# Patient Record
Sex: MALE | Race: WHITE | NOT HISPANIC OR LATINO | Employment: OTHER | URBAN - METROPOLITAN AREA
[De-identification: names, ages, dates, MRNs, and addresses within clinical notes are randomized per-mention and may not be internally consistent; named-entity substitution may affect disease eponyms.]

---

## 2017-01-10 ENCOUNTER — HISTORICAL (OUTPATIENT)
Dept: RADIOLOGY | Facility: HOSPITAL | Age: 72
End: 2017-01-10

## 2017-02-03 ENCOUNTER — HISTORICAL (OUTPATIENT)
Dept: LAB | Facility: HOSPITAL | Age: 72
End: 2017-02-03

## 2017-03-17 ENCOUNTER — HISTORICAL (OUTPATIENT)
Dept: RADIOLOGY | Facility: HOSPITAL | Age: 72
End: 2017-03-17

## 2017-07-17 ENCOUNTER — HISTORICAL (OUTPATIENT)
Dept: RADIOLOGY | Facility: HOSPITAL | Age: 72
End: 2017-07-17

## 2017-08-25 ENCOUNTER — HISTORICAL (OUTPATIENT)
Dept: RADIOLOGY | Facility: HOSPITAL | Age: 72
End: 2017-08-25

## 2017-10-03 ENCOUNTER — HISTORICAL (OUTPATIENT)
Dept: LAB | Facility: HOSPITAL | Age: 72
End: 2017-10-03

## 2017-10-03 LAB
BUN SERPL-MCNC: 13 MG/DL (ref 7–18)
CALCIUM SERPL-MCNC: 8.6 MG/DL (ref 8.5–10.1)
CHLORIDE SERPL-SCNC: 95 MMOL/L (ref 98–107)
CO2 SERPL-SCNC: 33.8 MMOL/L (ref 21–32)
CREAT SERPL-MCNC: 1.09 MG/DL (ref 0.7–1.3)
GLUCOSE SERPL-MCNC: 128 MG/DL (ref 74–106)
POTASSIUM SERPL-SCNC: 4.1 MMOL/L (ref 3.5–5.1)
SODIUM SERPL-SCNC: 132 MMOL/L (ref 136–145)

## 2017-12-22 ENCOUNTER — HOSPITAL ENCOUNTER (OUTPATIENT)
Dept: MEDSURG UNIT | Facility: HOSPITAL | Age: 72
End: 2017-12-23
Attending: FAMILY MEDICINE | Admitting: FAMILY MEDICINE

## 2017-12-22 LAB
CK MB SERPL-MCNC: 1.5 NG/ML (ref 0.5–3.6)
CK MB SERPL-MCNC: 2.3 NG/ML (ref 0.5–3.6)
CK SERPL-CCNC: 160 MG/DL (ref 26–308)
CK SERPL-CCNC: 201 MG/DL (ref 26–308)
TROPONIN I SERPL-MCNC: <0.017 NG/ML (ref 0.02–0.06)
TROPONIN I SERPL-MCNC: <0.017 NG/ML (ref 0.02–0.06)

## 2017-12-23 LAB
BUN SERPL-MCNC: 8 MG/DL (ref 7–18)
CALCIUM SERPL-MCNC: 8.3 MG/DL (ref 8.5–10.1)
CHLORIDE SERPL-SCNC: 102 MMOL/L (ref 98–107)
CK MB SERPL-MCNC: 1.8 NG/ML (ref 0.5–3.6)
CK SERPL-CCNC: 180 MG/DL (ref 26–308)
CO2 SERPL-SCNC: 25.9 MMOL/L (ref 21–32)
CREAT SERPL-MCNC: 0.98 MG/DL (ref 0.7–1.3)
GLUCOSE SERPL-MCNC: 102 MG/DL (ref 74–106)
POTASSIUM SERPL-SCNC: 4.2 MMOL/L (ref 3.5–5.1)
SODIUM SERPL-SCNC: 134 MMOL/L (ref 136–145)
TROPONIN I SERPL-MCNC: <0.017 NG/ML (ref 0.02–0.06)

## 2018-01-23 ENCOUNTER — HOSPITAL ENCOUNTER (OUTPATIENT)
Dept: MEDSURG UNIT | Facility: HOSPITAL | Age: 73
End: 2018-01-25
Attending: FAMILY MEDICINE | Admitting: FAMILY MEDICINE

## 2018-01-24 LAB
ABS NEUT (OLG): 5.98
ALBUMIN SERPL-MCNC: 3.2 GM/DL (ref 3.4–5)
ALBUMIN/GLOB SERPL: 0.9 RATIO (ref 1.1–2)
ALP SERPL-CCNC: 74 UNIT/L (ref 46–116)
ALT SERPL-CCNC: 18 UNIT/L (ref 12–78)
AST SERPL-CCNC: 14 UNIT/L (ref 10–37)
BASOPHILS # BLD AUTO: 0.02 X10(3)/MCL
BASOPHILS NFR BLD AUTO: 0.2 %
BILIRUB SERPL-MCNC: 0.4 MG/DL (ref 0.2–1)
BILIRUBIN DIRECT+TOT PNL SERPL-MCNC: 0.17 MG/DL (ref 0–0.2)
BILIRUBIN DIRECT+TOT PNL SERPL-MCNC: 0.22 MG/DL
BUN SERPL-MCNC: 12 MG/DL (ref 7–18)
CALCIUM SERPL-MCNC: 8.4 MG/DL (ref 8.5–10.1)
CHLORIDE SERPL-SCNC: 99 MMOL/L (ref 98–107)
CK MB SERPL-MCNC: 0.5 NG/ML (ref 0.5–3.6)
CK MB SERPL-MCNC: 0.6 NG/ML (ref 0.5–3.6)
CK SERPL-CCNC: 122 MG/DL (ref 26–308)
CK SERPL-CCNC: 131 MG/DL (ref 26–308)
CO2 SERPL-SCNC: 26.4 MMOL/L (ref 21–32)
CREAT SERPL-MCNC: 1.1 MG/DL (ref 0.7–1.3)
EOSINOPHIL # BLD AUTO: 0.17 X10(3)/MCL
EOSINOPHIL NFR BLD AUTO: 2.1 %
ERYTHROCYTE [DISTWIDTH] IN BLOOD BY AUTOMATED COUNT: 13 %
GLOBULIN SER-MCNC: 3.7 GM/DL (ref 2.4–3.5)
GLUCOSE SERPL-MCNC: 121 MG/DL (ref 74–106)
HCT VFR BLD AUTO: 34.2 % (ref 39–49)
HGB BLD-MCNC: 11.7 GM/DL (ref 12.6–16.6)
IMM GRANULOCYTES # BLD AUTO: 0.01 10*3/UL (ref 0–0.1)
IMM GRANULOCYTES NFR BLD AUTO: 0.1 % (ref 0–1)
LYMPHOCYTES # BLD AUTO: 1.09 X10(3)/MCL
LYMPHOCYTES NFR BLD AUTO: 13.4 %
MCH RBC QN AUTO: 29.6 PG (ref 27–33)
MCHC RBC AUTO-ENTMCNC: 34.2 GM/DL (ref 32–35)
MCV RBC AUTO: 86.6 FL (ref 84–97)
MONOCYTES # BLD AUTO: 0.84 X10(3)/MCL
MONOCYTES NFR BLD AUTO: 10.4 %
NEUTROPHILS # BLD AUTO: 5.98 X10(3)/MCL
NEUTROPHILS NFR BLD AUTO: 73.8 %
PLATELET # BLD AUTO: 166 X10(3)/MCL (ref 151–368)
PMV BLD AUTO: 10 FL
POTASSIUM SERPL-SCNC: 3.6 MMOL/L (ref 3.5–5.1)
PROT SERPL-MCNC: 6.9 GM/DL (ref 6.4–8.2)
RBC # BLD AUTO: 3.95 X10(6)/MCL (ref 4.3–5.6)
SODIUM SERPL-SCNC: 135 MMOL/L (ref 136–145)
TROPONIN I SERPL-MCNC: <0.017 NG/ML (ref 0.02–0.06)
TROPONIN I SERPL-MCNC: <0.017 NG/ML (ref 0.02–0.06)
WBC # SPEC AUTO: 8.11 X10(3)/MCL (ref 3.4–9.2)

## 2018-01-31 ENCOUNTER — HISTORICAL (OUTPATIENT)
Dept: RADIOLOGY | Facility: HOSPITAL | Age: 73
End: 2018-01-31

## 2018-01-31 LAB
ABS NEUT (OLG): 5.78
ALBUMIN SERPL-MCNC: 3.6 GM/DL (ref 3.4–5)
ALBUMIN/GLOB SERPL: 0.9 RATIO (ref 1.1–2)
ALP SERPL-CCNC: 105 UNIT/L (ref 46–116)
ALT SERPL-CCNC: 22 UNIT/L (ref 12–78)
AST SERPL-CCNC: 17 UNIT/L (ref 10–37)
BASOPHILS # BLD AUTO: 0.05 X10(3)/MCL
BASOPHILS NFR BLD AUTO: 0.6 %
BILIRUB SERPL-MCNC: 0.2 MG/DL (ref 0.2–1)
BILIRUBIN DIRECT+TOT PNL SERPL-MCNC: 0.08 MG/DL (ref 0–0.2)
BILIRUBIN DIRECT+TOT PNL SERPL-MCNC: 0.16 MG/DL
BUN SERPL-MCNC: 10 MG/DL (ref 7–18)
CALCIUM SERPL-MCNC: 9 MG/DL (ref 8.5–10.1)
CHLORIDE SERPL-SCNC: 93 MMOL/L (ref 98–107)
CO2 SERPL-SCNC: 28.1 MMOL/L (ref 21–32)
CREAT SERPL-MCNC: 1.02 MG/DL (ref 0.7–1.3)
EOSINOPHIL # BLD AUTO: 0.21 X10(3)/MCL
EOSINOPHIL NFR BLD AUTO: 2.7 %
ERYTHROCYTE [DISTWIDTH] IN BLOOD BY AUTOMATED COUNT: 13 %
GLOBULIN SER-MCNC: 4.2 GM/DL (ref 2.4–3.5)
GLUCOSE SERPL-MCNC: 77 MG/DL (ref 74–106)
HCT VFR BLD AUTO: 39.4 % (ref 39–49)
HGB BLD-MCNC: 13.5 GM/DL (ref 12.6–16.6)
IMM GRANULOCYTES # BLD AUTO: 0.15 10*3/UL (ref 0–0.1)
IMM GRANULOCYTES NFR BLD AUTO: 1.9 % (ref 0–1)
INR PPP: 0.98
LYMPHOCYTES # BLD AUTO: 1.05 X10(3)/MCL
LYMPHOCYTES NFR BLD AUTO: 13.5 %
MCH RBC QN AUTO: 29.1 PG (ref 27–33)
MCHC RBC AUTO-ENTMCNC: 34.3 GM/DL (ref 32–35)
MCV RBC AUTO: 84.9 FL (ref 84–97)
MONOCYTES # BLD AUTO: 0.53 X10(3)/MCL
MONOCYTES NFR BLD AUTO: 6.8 %
NEUTROPHILS # BLD AUTO: 5.78 X10(3)/MCL
NEUTROPHILS NFR BLD AUTO: 74.5 %
PLATELET # BLD AUTO: 320 X10(3)/MCL (ref 151–368)
PMV BLD AUTO: 10 FL
POTASSIUM SERPL-SCNC: 5.3 MMOL/L (ref 3.5–5.1)
PROT SERPL-MCNC: 7.8 GM/DL (ref 6.4–8.2)
PROTHROMBIN TIME: 9.9 SECOND(S) (ref 9–11.5)
RBC # BLD AUTO: 4.64 X10(6)/MCL (ref 4.3–5.6)
SODIUM SERPL-SCNC: 129 MMOL/L (ref 136–145)
WBC # SPEC AUTO: 7.77 X10(3)/MCL (ref 3.4–9.2)

## 2018-04-27 ENCOUNTER — HISTORICAL (OUTPATIENT)
Dept: LAB | Facility: HOSPITAL | Age: 73
End: 2018-04-27

## 2018-04-27 LAB
CHOLEST SERPL-MCNC: 130 MG/DL (ref 50–200)
CHOLEST/HDLC SERPL: 3 {RATIO} (ref 0–5)
HDLC SERPL-MCNC: 38 MG/DL (ref 35–60)
LDLC SERPL CALC-MCNC: 78 MG/DL (ref 50–140)
PSA SERPL-MCNC: 1.34 NG/ML (ref 0–4)
TRIGL SERPL-MCNC: 72 MG/DL (ref 30–150)
VLDLC SERPL CALC-MCNC: 14 MG/DL

## 2018-08-06 ENCOUNTER — HISTORICAL (OUTPATIENT)
Dept: RADIOLOGY | Facility: HOSPITAL | Age: 73
End: 2018-08-06

## 2018-09-06 ENCOUNTER — HISTORICAL (OUTPATIENT)
Dept: LAB | Facility: HOSPITAL | Age: 73
End: 2018-09-06

## 2018-09-06 LAB
ALBUMIN SERPL-MCNC: 3.4 GM/DL (ref 3.4–5)
ALP SERPL-CCNC: 92 UNIT/L (ref 46–116)
ALT SERPL-CCNC: 16 UNIT/L (ref 12–78)
AST SERPL-CCNC: 12 UNIT/L (ref 10–37)
BILIRUB SERPL-MCNC: 0.4 MG/DL (ref 0.2–1)
BILIRUBIN DIRECT+TOT PNL SERPL-MCNC: 0.11 MG/DL (ref 0–0.2)
BILIRUBIN DIRECT+TOT PNL SERPL-MCNC: 0.29 MG/DL
CHOLEST SERPL-MCNC: 131 MG/DL (ref 50–200)
CHOLEST/HDLC SERPL: 3 {RATIO} (ref 0–5)
HDLC SERPL-MCNC: 40 MG/DL (ref 35–60)
LDLC SERPL CALC-MCNC: 78 MG/DL (ref 50–140)
PROT SERPL-MCNC: 7.3 GM/DL (ref 6.4–8.2)
TRIGL SERPL-MCNC: 67 MG/DL (ref 30–150)
VLDLC SERPL CALC-MCNC: 13 MG/DL

## 2019-02-04 ENCOUNTER — HISTORICAL (OUTPATIENT)
Dept: RADIOLOGY | Facility: HOSPITAL | Age: 74
End: 2019-02-04

## 2019-03-22 ENCOUNTER — HISTORICAL (OUTPATIENT)
Dept: RADIOLOGY | Facility: HOSPITAL | Age: 74
End: 2019-03-22

## 2019-03-27 ENCOUNTER — HISTORICAL (OUTPATIENT)
Dept: RADIOLOGY | Facility: HOSPITAL | Age: 74
End: 2019-03-27

## 2019-05-09 ENCOUNTER — HISTORICAL (OUTPATIENT)
Dept: RADIOLOGY | Facility: HOSPITAL | Age: 74
End: 2019-05-09

## 2019-05-09 LAB
ABS NEUT (OLG): 6.47
ALBUMIN SERPL-MCNC: 3.3 GM/DL (ref 3.4–5)
ALBUMIN/GLOB SERPL: 1 RATIO (ref 1.1–2)
ALP SERPL-CCNC: 84 UNIT/L (ref 46–116)
ALT SERPL-CCNC: 13 UNIT/L (ref 12–78)
AST SERPL-CCNC: 12 UNIT/L (ref 10–37)
BASOPHILS # BLD AUTO: 0.03 X10(3)/MCL
BASOPHILS NFR BLD AUTO: 0.4 %
BILIRUB SERPL-MCNC: 0.3 MG/DL (ref 0.2–1)
BILIRUBIN DIRECT+TOT PNL SERPL-MCNC: 0.12 MG/DL (ref 0–0.2)
BILIRUBIN DIRECT+TOT PNL SERPL-MCNC: 0.18 MG/DL
BUN SERPL-MCNC: 12 MG/DL (ref 7–18)
CALCIUM SERPL-MCNC: 8.4 MG/DL (ref 8.5–10.1)
CHLORIDE SERPL-SCNC: 100 MMOL/L (ref 98–107)
CHOLEST SERPL-MCNC: 116 MG/DL (ref 50–200)
CHOLEST/HDLC SERPL: 3 {RATIO} (ref 0–5)
CO2 SERPL-SCNC: 27.8 MMOL/L (ref 21–32)
CREAT SERPL-MCNC: 1.1 MG/DL (ref 0.7–1.3)
EOSINOPHIL # BLD AUTO: 0.24 X10(3)/MCL
EOSINOPHIL NFR BLD AUTO: 2.9 %
ERYTHROCYTE [DISTWIDTH] IN BLOOD BY AUTOMATED COUNT: 16 %
GLOBULIN SER-MCNC: 3.3 GM/DL (ref 2.4–3.5)
GLUCOSE SERPL-MCNC: 92 MG/DL (ref 74–106)
HCT VFR BLD AUTO: 38.5 % (ref 39–49)
HDLC SERPL-MCNC: 36 MG/DL (ref 35–60)
HGB BLD-MCNC: 12.5 GM/DL (ref 12.6–16.6)
IMM GRANULOCYTES # BLD AUTO: 0.01 10*3/UL (ref 0–0.1)
IMM GRANULOCYTES NFR BLD AUTO: 0.1 % (ref 0–1)
LDLC SERPL CALC-MCNC: 64 MG/DL (ref 50–140)
LYMPHOCYTES # BLD AUTO: 0.88 X10(3)/MCL
LYMPHOCYTES NFR BLD AUTO: 10.5 %
MCH RBC QN AUTO: 27.7 PG (ref 27–33)
MCHC RBC AUTO-ENTMCNC: 32.5 GM/DL (ref 32–35)
MCV RBC AUTO: 85.4 FL (ref 84–97)
MONOCYTES # BLD AUTO: 0.73 X10(3)/MCL
MONOCYTES NFR BLD AUTO: 8.7 %
NEUTROPHILS # BLD AUTO: 6.47 X10(3)/MCL
NEUTROPHILS NFR BLD AUTO: 77.4 %
PLATELET # BLD AUTO: 198 X10(3)/MCL (ref 151–368)
PMV BLD AUTO: 9 FL
POTASSIUM SERPL-SCNC: 4.1 MMOL/L (ref 3.5–5.1)
PROT SERPL-MCNC: 6.6 GM/DL (ref 6.4–8.2)
PSA SERPL-MCNC: 1.25 NG/ML (ref 0–4)
RBC # BLD AUTO: 4.51 X10(6)/MCL (ref 4.3–5.6)
SODIUM SERPL-SCNC: 136 MMOL/L (ref 136–145)
TRIGL SERPL-MCNC: 79 MG/DL (ref 30–150)
VLDLC SERPL CALC-MCNC: 16 MG/DL
WBC # SPEC AUTO: 8.36 X10(3)/MCL (ref 3.4–9.2)

## 2020-11-05 ENCOUNTER — HISTORICAL (OUTPATIENT)
Dept: LAB | Facility: HOSPITAL | Age: 75
End: 2020-11-05

## 2020-11-05 LAB
ABS NEUT (OLG): 5.49
ALBUMIN SERPL-MCNC: 3.4 GM/DL (ref 3.4–4.8)
ALBUMIN/GLOB SERPL: 1 RATIO (ref 1.1–2)
ALP SERPL-CCNC: 81 UNIT/L (ref 40–150)
ALT SERPL-CCNC: 9 UNIT/L (ref 0–55)
AST SERPL-CCNC: 13 UNIT/L (ref 5–34)
BASOPHILS # BLD AUTO: 0.02 X10(3)/MCL
BASOPHILS NFR BLD AUTO: 0.3 %
BILIRUB SERPL-MCNC: 0.5 MG/DL
BILIRUBIN DIRECT+TOT PNL SERPL-MCNC: 0.2 MG/DL (ref 0–0.5)
BILIRUBIN DIRECT+TOT PNL SERPL-MCNC: 0.3 MG/DL
BUN SERPL-MCNC: 7 MG/DL (ref 8.4–25.7)
CALCIUM SERPL-MCNC: 9.6 MG/DL (ref 8.8–10)
CHLORIDE SERPL-SCNC: 101 MMOL/L (ref 98–107)
CHOLEST SERPL-MCNC: 121 MG/DL
CHOLEST/HDLC SERPL: 3 {RATIO} (ref 0–5)
CO2 SERPL-SCNC: 28 MEQ/L (ref 23–31)
CREAT SERPL-MCNC: 0.93 MG/DL (ref 0.73–1.18)
EOSINOPHIL # BLD AUTO: 0.15 X10(3)/MCL
EOSINOPHIL NFR BLD AUTO: 2 %
ERYTHROCYTE [DISTWIDTH] IN BLOOD BY AUTOMATED COUNT: 14 %
GLOBULIN SER-MCNC: 3.4 GM/DL (ref 2.4–3.5)
GLUCOSE SERPL-MCNC: 104 MG/DL (ref 82–115)
HCT VFR BLD AUTO: 38.6 % (ref 39–49)
HDLC SERPL-MCNC: 36 MG/DL (ref 35–60)
HGB BLD-MCNC: 12.1 GM/DL (ref 12.6–16.6)
IMM GRANULOCYTES # BLD AUTO: 0.03 10*3/UL (ref 0–0.1)
IMM GRANULOCYTES NFR BLD AUTO: 0.4 % (ref 0–1)
LDLC SERPL CALC-MCNC: 70 MG/DL (ref 50–140)
LYMPHOCYTES # BLD AUTO: 1.09 X10(3)/MCL
LYMPHOCYTES NFR BLD AUTO: 14.8 %
MCH RBC QN AUTO: 26.8 PG (ref 27–33)
MCHC RBC AUTO-ENTMCNC: 31.3 GM/DL (ref 32–35)
MCV RBC AUTO: 85.6 FL (ref 84–97)
MONOCYTES # BLD AUTO: 0.57 X10(3)/MCL
MONOCYTES NFR BLD AUTO: 7.8 %
NEUTROPHILS # BLD AUTO: 5.49 X10(3)/MCL
NEUTROPHILS NFR BLD AUTO: 74.7 %
PLATELET # BLD AUTO: 239 X10(3)/MCL (ref 140–450)
PMV BLD AUTO: 10 FL
POTASSIUM SERPL-SCNC: 4.3 MMOL/L (ref 3.5–5.1)
PROT SERPL-MCNC: 6.8 GM/DL (ref 5.8–7.6)
PSA SERPL-MCNC: 0.94 NG/ML
RBC # BLD AUTO: 4.51 X10(6)/MCL (ref 4.3–5.6)
SODIUM SERPL-SCNC: 136 MMOL/L (ref 136–145)
TRIGL SERPL-MCNC: 77 MG/DL (ref 34–140)
VLDLC SERPL CALC-MCNC: 15 MG/DL
WBC # SPEC AUTO: 7.35 X10(3)/MCL (ref 3.4–9.2)

## 2021-11-19 ENCOUNTER — HISTORICAL (OUTPATIENT)
Dept: LAB | Facility: HOSPITAL | Age: 76
End: 2021-11-19

## 2021-11-19 LAB
ABS NEUT (OLG): 4.64
ALBUMIN SERPL-MCNC: 3.7 GM/DL (ref 3.4–4.8)
ALBUMIN/GLOB SERPL: 0.9 RATIO (ref 1.1–2)
ALP SERPL-CCNC: 87 UNIT/L (ref 40–150)
ALT SERPL-CCNC: 10 UNIT/L (ref 0–55)
AST SERPL-CCNC: 17 UNIT/L (ref 5–34)
BASOPHILS # BLD AUTO: 0.03 X10(3)/MCL
BASOPHILS NFR BLD AUTO: 0.5 %
BILIRUB SERPL-MCNC: 0.5 MG/DL
BILIRUBIN DIRECT+TOT PNL SERPL-MCNC: 0.2 MG/DL (ref 0–0.5)
BILIRUBIN DIRECT+TOT PNL SERPL-MCNC: 0.3 MG/DL
BUN SERPL-MCNC: 7 MG/DL (ref 8.4–25.7)
CALCIUM SERPL-MCNC: 9.7 MG/DL (ref 8.7–10.5)
CHLORIDE SERPL-SCNC: 101 MMOL/L (ref 98–107)
CHOLEST SERPL-MCNC: 129 MG/DL
CHOLEST/HDLC SERPL: 4 {RATIO} (ref 0–5)
CO2 SERPL-SCNC: 27 MEQ/L (ref 23–31)
CREAT SERPL-MCNC: 0.91 MG/DL (ref 0.73–1.18)
EOSINOPHIL # BLD AUTO: 0.17 X10(3)/MCL
EOSINOPHIL NFR BLD AUTO: 2.6 %
ERYTHROCYTE [DISTWIDTH] IN BLOOD BY AUTOMATED COUNT: 16 %
GLOBULIN SER-MCNC: 3.9 GM/DL (ref 2.4–3.5)
GLUCOSE SERPL-MCNC: 106 MG/DL (ref 82–115)
HCT VFR BLD AUTO: 39.6 % (ref 39–49)
HDLC SERPL-MCNC: 34 MG/DL (ref 35–60)
HGB BLD-MCNC: 12.5 GM/DL (ref 12.6–16.6)
IMM GRANULOCYTES # BLD AUTO: 0.03 10*3/UL (ref 0–0.1)
IMM GRANULOCYTES NFR BLD AUTO: 0.5 % (ref 0–1)
LDLC SERPL CALC-MCNC: 76 MG/DL (ref 50–140)
LYMPHOCYTES # BLD AUTO: 1.09 X10(3)/MCL
LYMPHOCYTES NFR BLD AUTO: 17 %
MCH RBC QN AUTO: 26 PG (ref 27–33)
MCHC RBC AUTO-ENTMCNC: 31.6 GM/DL (ref 32–35)
MCV RBC AUTO: 82.3 FL (ref 84–97)
MONOCYTES # BLD AUTO: 0.46 X10(3)/MCL
MONOCYTES NFR BLD AUTO: 7.2 %
NEUTROPHILS # BLD AUTO: 4.64 X10(3)/MCL
NEUTROPHILS NFR BLD AUTO: 72.2 %
PLATELET # BLD AUTO: 233 X10(3)/MCL (ref 140–450)
PMV BLD AUTO: 11 FL
POTASSIUM SERPL-SCNC: 4.6 MMOL/L (ref 3.5–5.1)
PROT SERPL-MCNC: 7.6 GM/DL (ref 5.8–7.6)
PSA SERPL-MCNC: 1.31 NG/ML
RBC # BLD AUTO: 4.81 X10(6)/MCL (ref 4.3–5.6)
SODIUM SERPL-SCNC: 135 MMOL/L (ref 136–145)
TRIGL SERPL-MCNC: 95 MG/DL (ref 34–140)
VLDLC SERPL CALC-MCNC: 19 MG/DL
WBC # SPEC AUTO: 6.42 X10(3)/MCL (ref 3.4–9.2)

## 2022-04-09 ENCOUNTER — HISTORICAL (OUTPATIENT)
Dept: ADMINISTRATIVE | Facility: HOSPITAL | Age: 77
End: 2022-04-09
Payer: COMMERCIAL

## 2022-04-27 VITALS
WEIGHT: 167.56 LBS | HEIGHT: 68 IN | DIASTOLIC BLOOD PRESSURE: 78 MMHG | BODY MASS INDEX: 25.39 KG/M2 | SYSTOLIC BLOOD PRESSURE: 124 MMHG

## 2022-04-30 NOTE — H&P
Patient:   Ferny Archuleta             MRN: [id number]            FIN: [id number]               Age:   72 years     Sex:  Male     :  1945   Associated Diagnoses:   Hyponatremia; Hypokalemia; Chest pain   Author:   Matt DUTTON, Sulaiman Yi      Basic Information   Source of history:  Self.    Present at bedside:  Significant other.    Referral source:  Emergency department.    History limitation:  None.       Chief Complaint       2017 11:36 CST     chest pain, Nausea, states due to esophageal surgery pt unable to vomit, chills, left arm and leg numbness began at midnight,    2017 10:47 CST     NAUSEA VOMITING        History of Present Illness             The patient presents with Nausea and chest pain.  This is a 72-year-old white male who presented to the clinic on the morning of 17 due to nausea and left-sided chest pain.  Patient states nausea began last p.m. and woke up this morning with continued nausea.  He was also reporting some left-sided chest pain that was radiating into his left arm associated with numbness.  Patient sent to ER for further evaluation.  Workup in ER revealed patient to be hyponatremic as well as hypokalemic.  Patient will be admitted on telemetry and serial cardiac markers will be drawn out every 8 hours ×3.  We will replace electrolytes and repeat lab work in a.m..        Review of Systems   Constitutional:  Negative.    Ear/Nose/Mouth/Throat:  Negative.    Respiratory:  Cough, No sputum production.    Cardiovascular:       Chest pain: Left sided, Patient reports left arm numbness.    Gastrointestinal:  Nausea, Vomiting, Patient reports minimal p.o. intake due to nausea, No diarrhea, No constipation.    Genitourinary:  Negative.    Psychiatric:  Negative.       Health Status   Allergies:    Allergic Reactions (Selected)  No Known Allergies   Problem list:    All Problems  Allergic rhinitis / SNOMED CT 119470183 / Confirmed  Left cervical radiculopathy /  SNOMED CT 758385241 / Confirmed  Hypertension / SNOMED CT 12529741 / Confirmed  Neuropathy / SNOMED CT 0812024242 / Confirmed  Ulnar neuropathy of left upper extremity / SNOMED CT 200217180 / Confirmed      Histories   Procedure history:    Colonoscopy (SNOMED CT 278304433) in the month of 3/2017 at 71 Years.  Cholecystectomy (SNOMED CT 45083278).  hiatal hernia.  Stent placement (SNOMED CT 397079725) performed by Skip DUTTON, William ROJAS  Comments:  1/27/2016 13:34 - Matt DUTTON, Sulaiman Yi  cardiac stent 12/15/15  esophageal surgery.   Social History        Social & Psychosocial Habits    Alcohol  09/21/2015  Use: Never    Home/Environment  09/25/2017  Lives with: Spouse    Substance Abuse  09/21/2015  Use: Never    Tobacco  09/21/2015  Use: Former smoker    Type: Cigarettes    Tobacco use per day: 20    Number of years: 25  .        Physical Examination   Vital Signs   12/22/2017 14:24 CST     Temperature Oral          36.4 DegC                             Temperature Oral (calculated)             97.52 DegF                             Heart Rate Monitored      62 bpm                             Respiratory Rate          20 br/min                             SpO2                      98 %                             Oxygen Therapy            Room air                             Systolic Blood Pressure   151 mmHg  HI                             Diastolic Blood Pressure  79 mmHg                             Mean Arterial Pressure, Cuff              103 mmHg     General:  Alert and oriented.    Eye:  Pupils are equal, round and reactive to light, Extraocular movements are intact.    HENT:  Oral mucosa is moist, No pharyngeal erythema.    Neck:  No jugular venous distention.    Respiratory:  Lungs are clear to auscultation, Respirations are non-labored, Breath sounds are equal.    Cardiovascular:  Normal rate, Regular rhythm, No murmur.    Gastrointestinal:  Soft, Non-tender, Non-distended, Normal bowel sounds.     Integumentary:  Warm.    Neurologic:  Alert, Oriented, No focal deficits, Cranial Nerves II-XII are grossly intact.    Psychiatric:  Cooperative, Appropriate mood & affect, Normal judgment.       Review / Management   Laboratory Results   Today's Lab Results : PowerNote Discrete Results   12/22/2017 11:45 CST     WBC                       8.88 x10(3)/mcL                             RBC                       4.57 x10(6)/mcL                             Hgb                       13.6 gm/dL                             Hct                       37.9 %  LOW                             Platelet                  272 x10(3)/mcL                             MCV                       82.9 fL  LOW                             MCH                       29.8 pg                             MCHC                      35.9 gm/dL  HI                             RDW                       13  NA                             MPV                       10  NA                             Abs Neut                  6.88  NA                             Neutro Auto               77.5 %  NA                             Lymph Auto                14.2 %  NA                             Mono Auto                 7.3 %  NA                             Eos Auto                  0.3 %  NA                             Abs Eos                   0.03 x10(3)/mcL  NA                             Basophil Auto             0.2 %  NA                             Abs Neutro                6.88 x10(3)/mcL  NA                             Abs Lymph                 1.26 x10(3)/mcL  NA                             Abs Mono                  0.65 x10(3)/mcL  NA                             Abs Baso                  0.02 x10(3)/mcL  NA                             IG%                       0.5 %                             IG#                       0.0400                             Sodium Lvl                122 mmol/L  LOW                             Potassium Lvl              3.3 mmol/L  LOW                             Chloride                  87 mmol/L  LOW                             CO2                       26.5 mmol/L                             Calcium Lvl               9.2 mg/dL                             Magnesium Lvl             1.6 mg/dL  LOW                             Glucose Lvl               127 mg/dL  HI                             BUN                       9 mg/dL                             Creatinine                1.00 mg/dL                             eGFR-AA                   >60 mL/min/1.73 m2  NA                             eGFR-NARENDRA                  >60 mL/min/1.73 m2  NA                             Bili Total                0.7                             Bili Direct               0.21 mg/dL  HI                             Bili Indirect             0.51 mg/dL  NA                             AST                       24 unit/L                             ALT                       22 unit/L                             Alk Phos                  86 unit/L                             Total Protein             7.9 gm/dL                             Albumin Lvl               4.0 gm/dL                             Globulin                  3.9 gm/dL  HI                             A/G Ratio                 1.0 ratio  LOW                             NT pro BNP.               350.60 pg/mL  HI                             Total CK                  206 mg/dL                             CK MB                     2.2 ng/mL                             Troponin-I                <0.017 ng/mL           Impression and Plan   Diagnosis     Hyponatremia (IKD53-QR E87.1).     Hypokalemia (SXC92-LG E87.6).     Chest pain (HZY74-FV R07.9).     Course:    1. Chest pain  Admit patient  Place patient on telemetry  Serial cardiac markers every 8 hours ×3    2.  Hyponatremia  Replace sodium with IV fluids  Repeat lab work in a.m.    3.  Hypokalemia  Replace potassium with IV fluids  Repeat lab  work in a...

## 2022-04-30 NOTE — DISCHARGE SUMMARY
Patient:   Ferny Archuleta             MRN: [id number]            FIN: [id number]               Age:   72 years     Sex:  Male     :  1945   Associated Diagnoses:   None   Author:   Matt DUTTON, Sulaiman Yi      Discharge Information      Discharge Summary Information   ADMIT/DISCHARGE DATE   Admit Date: 2018  Discharge Date: 2018     Physicians   Attending Physician - Matt DUTTON, Sulaiman Yi  Admitting Physician - Matt DUTTON, Sulaiman Yi  Consulting Physician - Matt DUTTON, Sulaiman Yi  Primary Care Physician - Matt DUTTON, Sulaiman Yi  Primary Care Physician - Matt DUTTON, Sulaiman Yi     Discharge Diagnosis   Major depressive disorder, single episode, unspecified (F32.9)   Shortness of breath (R06.02)      Procedures   No procedures recorded for this visit.   Immunizations   No immunizations recorded for this visit.     Discharge Medications   Prescribed  FLUoxetine (fluoxetine 10 mg oral capsule) 10 mg, Oral, Daily  albuterol-ipratropium (DuoNeb 0.5 mg-2.5 mg/3 mL inhalation solution) 3 mL, NEB, q6hr Resp  dextromethorphan-promethazine (Promethazine DM 6.25 mg-15 mg/5 mL oral syrup) 5 mL, Oral, q6hr, PRN for cough  fluticasone nasal (Flonase 50 mcg/inh nasal spray) 2 spray(s), Nasal, BID  Continue  aspirin (aspirin 81 mg oral tablet) 81 mg, Oral, Daily  clopidogrel (CLOPIDOGREL TAB 75MG) 75 mg, Oral, Daily  gabapentin (gabapentin 800 mg oral tablet) 800 mg, Oral, TID  ibuprofen (ibuprofen 800 mg oral tablet) 800 mg, Oral, Daily, PRN for pain  montelukast (Singulair 10 mg oral TABLET) 10 mg, Oral, qPM  pantoprazole (PANTOPRAZOLE TAB 40MG) 40 mg, Oral, Daily  quinapril (quinapril 20 mg oral tablet) 20 mg, Oral, Daily  simvastatin (SIMVASTATIN TAB 10MG) 10 mg, Oral, qPM        Education   Shortness of Breath, Easy-to-Read  Depression, Adult, Easy-to-Read  Discharge - Home         Followup   William Valdivia, on 2018   [place] office- Dr. Gutierrez's office is where he will be  seen.  [place] Course   Hospital Course     The patient presents with Shortness of breath.  This is a 72-year-old white male who resides at home with his wife.  Patient reports some increased shortness of breath over the last few days.  He was also reporting some increased blood pressure.  He began having some left-sided chest pain that was radiating into his left arm.  He became concerned and presented to the ER for further evaluation.  Workup in the ER revealed patient have elevated BNP.  Patient will be admitted for further treatment.         Patient had a normal course of recovery during this admit.  Today, 1/25/18, patient is chest pain-free.  Patient also reported some increased anxiety/depression during this admit.  Patient started on fluoxetine 10 mg daily.  He has done well.  He will be discharged home with fluoxetine 10 mg p.o. daily.  Rx sent to pharmacy.  Patient will also follow-up with Dr. Valdivia (cardiology) on 1/31/18.  Appointment has been scheduled.  Discharge medications discussed with patient..        Physical Examination      Vital Signs (last 24 hrs)_____  Last Charted___________  Temp Oral     37.0 DegC  (JAN 25 18:52)  Heart Rate Peripheral   73 bpm  (JAN 25 18:52)  Resp Rate         20 br/min  (JAN 25 18:52)  SBP      138 mmHg  (JAN 25 18:52)  DBP      72 mmHg  (JAN 25 18:52)  SpO2      95 %  (JAN 25 18:52)  Weight      76 kg  (JAN 25 06:00)     Intake and Output   Fluid Balance Primitives   1/25/2018 7:00 CST       Oral Intake               600 mL                             Urine Voided              500 mL                             Stool Count               1        General:  Alert and oriented, No acute distress.    Neck:  Supple.    Respiratory:  Lungs are clear to auscultation, Respirations are non-labored, Breath sounds are equal.    Cardiovascular:  Normal rate, Regular rhythm, No murmur.    Gastrointestinal:  Soft, Non-tender, Non-distended, Normal  bowel sounds.    Integumentary:  Warm.    Neurologic:  Alert, Oriented.    Psychiatric:  Cooperative, Appropriate mood & affect, Normal judgment.       Discharge Plan   Discharge Summary Plan   Discharge disposition: discharge to home into the care of family member.     Prescriptions: continue same medications, reviewed (with patient, with spouse), called to pharmacy.

## 2022-04-30 NOTE — H&P
Patient:   Ferny Archuleta             MRN: [id number]            FIN: [id number]               Age:   72 years     Sex:  Male     :  1945   Associated Diagnoses:   Depression; Shortness of breath   Author:   Matt DUTTON, Sulaiman Yi      Basic Information   Source of history:  Self.    Referral source:  Emergency department.    History limitation:  None.       Chief Complaint   2018 18:40 CST      SOB and chest pain yesterday and returned again today.  Coughing for 4-5 days with mucous sputum, body aches, runny nose, chills.        History of Present Illness             The patient presents with Shortness of breath.  This is a 72-year-old white male who resides at home with his wife.  Patient reports some increased shortness of breath over the last few days.  He was also reporting some increased blood pressure.  He began having some left-sided chest pain that was radiating into his left arm.  He became concerned and presented to the ER for further evaluation.  Workup in the ER revealed patient have elevated BNP.  Patient will be admitted for further treatment.        Review of Systems   Constitutional:  No fever, No chills.    Respiratory:  Cough, Sputum production.    Cardiovascular:       Chest pain: Left sided, Anterior, Lateral, Radiating into left arm.    Gastrointestinal:  Negative.    Musculoskeletal:  Negative.    Psychiatric:  Anxiety, Depression, Patient reports increased stress at home, reports being overwhelmed with current involvement in [employer] [facility].       Health Status   Allergies:    Allergic Reactions (Selected)  No Known Allergies   Problem list:    All Problems  Allergic rhinitis / SNOMED CT 371992808 / Confirmed  Left cervical radiculopathy / SNOMED CT 914854562 / Confirmed  Hypertension / SNOMED CT 04026067 / Confirmed  Neuropathy / SNOMED CT 9259942106 / Confirmed  Ulnar neuropathy of left upper extremity / SNOMED CT 163533946 / Confirmed      Histories   Procedure  history:    Colonoscopy (SNOMED CT 954884978) in the month of 3/2017 at 71 Years.  Cholecystectomy (SNOMED CT 80762076).  hiatal hernia.  Stent placement (SNOMED CT 059802295) performed by Skip DUTTON, William ROJAS  Comments:  1/27/2016 13:34 - Matt DUTTON, Sulaiman Yi  cardiac stent 12/15/15  esophageal surgery.   Social History        Social & Psychosocial Habits    Alcohol  09/21/2015  Use: Never    Home/Environment  09/25/2017  Lives with: Spouse    Substance Abuse  09/21/2015  Use: Never    Tobacco  09/21/2015  Use: Former smoker    Type: Cigarettes    Tobacco use per day: 20    Number of years: 25  .        Physical Examination   Vital Signs   1/24/2018 4:00 CST       Temperature Oral          36.5 DegC                             Temperature Oral (calculated)             97.70 DegF                             Heart Rate Monitored      71 bpm                             Respiratory Rate          18 br/min                             Respiratory Rate          18 br/min                             SpO2                      98 %                             SpO2                      98 %                             Saturation Probe Site     Hand, right                             Oxygen Therapy            Nasal cannula                             Oxygen Flow Rate          2 L/min                             Systolic Blood Pressure   118 mmHg                             Diastolic Blood Pressure  64 mmHg                             Mean Arterial Pressure, Cuff              82 mmHg                             Blood Pressure Location   Right arm     Intake and Output   Fluid Balance Primitives   1/23/2018 23:00 CST      Oral Intake               360 mL                             Urine Voided              1,250 mL                             Stool Count               0    1/23/2018 21:46 CST      Urine Voided              850 mL    1/23/2018 21:00 CST      Urine Voided              825 mL        General:  Alert and  oriented, No acute distress.         Skin: No cyanosis, No clubbing, No edema.    Respiratory:  Lungs are clear to auscultation, Respirations are non-labored, Breath sounds are equal.    Cardiovascular:  Normal rate, Regular rhythm, No murmur.    Gastrointestinal:  Soft, Non-tender, Non-distended, Normal bowel sounds.    Integumentary:  Warm.    Neurologic:  Alert, Oriented.    Psychiatric:  Cooperative, Appropriate mood & affect, Normal judgment.       Review / Management   Laboratory Results   Today's Lab Results : PowerNote Discrete Results   1/24/2018 3:59 CST       WBC                       8.11 x10(3)/mcL                             RBC                       3.95 x10(6)/mcL  LOW                             Hgb                       11.7 gm/dL  LOW                             Hct                       34.2 %  LOW                             Platelet                  166 x10(3)/mcL                             MCV                       86.6 fL                             MCH                       29.6 pg                             MCHC                      34.2 gm/dL                             RDW                       13  NA                             MPV                       10  NA                             Abs Neut                  5.98  NA                             Neutro Auto               73.8 %  NA                             Lymph Auto                13.4 %  NA                             Mono Auto                 10.4 %  NA                             Eos Auto                  2.1 %  NA                             Abs Eos                   0.17 x10(3)/mcL  NA                             Basophil Auto             0.2 %  NA                             Abs Neutro                5.98 x10(3)/mcL  NA                             Abs Lymph                 1.09 x10(3)/mcL  NA                             Abs Mono                  0.84 x10(3)/mcL  NA                             Abs Baso                   0.02 x10(3)/mcL  NA                             IG%                       0.1 %                             IG#                       0.0100                             Sodium Lvl                135 mmol/L  LOW                             Potassium Lvl             3.6 mmol/L                             Chloride                  99 mmol/L                             CO2                       26.4 mmol/L                             Calcium Lvl               8.4 mg/dL  LOW                             Glucose Lvl               121 mg/dL  HI                             BUN                       12 mg/dL                             Creatinine                1.10 mg/dL                             eGFR-AA                   >60 mL/min/1.73 m2  NA                             eGFR-NARENDRA                  >60 mL/min/1.73 m2  NA                             Bili Total                0.4                             Bili Direct               0.17 mg/dL                             Bili Indirect             0.22 mg/dL  NA                             AST                       14 unit/L                             ALT                       18 unit/L                             Alk Phos                  74 unit/L                             Total Protein             6.9 gm/dL                             Albumin Lvl               3.2 gm/dL  LOW                             Globulin                  3.7 gm/dL  HI                             A/G Ratio                 0.9 ratio  LOW                             Total CK                  131 mg/dL                             CK MB                     0.5 ng/mL                             Troponin-I                <0.017 ng/mL  LOW    1/23/2018 19:03 CST      NT pro BNP.               424.10 pg/mL  HI    1/23/2018 18:56 CST      WBC                       9.89 x10(3)/mcL  HI                             RBC                       4.22 x10(6)/mcL  LOW                             Hgb                        12.4 gm/dL  LOW                             Hct                       36.7 %  LOW                             Platelet                  173 x10(3)/mcL                             MCV                       87.0 fL                             MCH                       29.4 pg                             MCHC                      33.8 gm/dL                             RDW                       13  NA                             MPV                       11  NA                             Abs Neut                  8.05  NA                             Neutro Auto               81.4 %  NA                             Lymph Auto                10.0 %  NA                             Mono Auto                 5.9 %  NA                             Eos Auto                  2.4 %  NA                             Abs Eos                   0.24 x10(3)/mcL  NA                             Basophil Auto             0.2 %  NA                             Abs Neutro                8.05 x10(3)/mcL  NA                             Abs Lymph                 0.99 x10(3)/mcL  NA                             Abs Mono                  0.58 x10(3)/mcL  NA                             Abs Baso                  0.02 x10(3)/mcL  NA                             IG%                       0.1 %                             IG#                       0.0100                             Sodium Lvl                133 mmol/L  LOW                             Potassium Lvl             4.0 mmol/L                             Chloride                  98 mmol/L                             CO2                       25.4 mmol/L                             Calcium Lvl               8.4 mg/dL  LOW                             Glucose Lvl               119 mg/dL  HI                             BUN                       10 mg/dL                             Creatinine                1.05 mg/dL                             eGFR-AA                   >60 mL/min/1.73 m2  NA                              eGFR-NARENDRA                  >60 mL/min/1.73 m2  NA                             Bili Total                0.3                             Bili Direct               0.13 mg/dL                             Bili Indirect             0.19 mg/dL  NA                             AST                       16 unit/L                             ALT                       18 unit/L                             Alk Phos                  86 unit/L                             Total Protein             7.4 gm/dL                             Albumin Lvl               3.5 gm/dL                             Globulin                  3.9 gm/dL  HI                             A/G Ratio                 0.9 ratio  LOW                             Total CK                  184 mg/dL                             CK MB                     1.3 ng/mL                             Troponin-I                <0.017 ng/mL  LOW           Impression and Plan   Diagnosis     Depression (HDL16-OM F32.9).     Shortness of breath (KVC96-DJ R06.02).     Course:    1.  Shortness of breath  Patient has received IV Lasix  Monitor daily weights  Echo pending    2.  Depression  Start fluoxetine 10 mg daily  Monitor.

## 2022-04-30 NOTE — DISCHARGE SUMMARY
Patient:   Ferny Archuleta             MRN: [id number]            FIN: [id number]               Age:   72 years     Sex:  Male     :  1945   Associated Diagnoses:   Chest pain; Hypokalemia; Hyponatremia   Author:   Curt Keating MD      Results Review   General results   Most recent results   Discrete results only   2017 7:07 CST      Total CK                  180 mg/dL                             CK MB                     1.8 ng/mL                             Troponin-I                <0.017 ng/mL    2017 7:00 CST      Sodium Lvl                134 mmol/L  LOW                             Potassium Lvl             4.2 mmol/L                             Chloride                  102 mmol/L                             CO2                       25.9 mmol/L                             Calcium Lvl               8.3 mg/dL  LOW                             Glucose Lvl               102 mg/dL                             BUN                       8 mg/dL                             Creatinine                0.98 mg/dL                             eGFR-AA                   >60 mL/min/1.73 m2  NA                             eGFR-NARENDRA                  >60 mL/min/1.73 m2  NA    2017 23:00 CST     Total CK                  201 mg/dL                             CK MB                     2.3 ng/mL                             Troponin-I                <0.017 ng/mL    2017 15:15 CST     Total CK                  160 mg/dL                             CK MB                     1.5 ng/mL                             Troponin-I                <0.017 ng/mL    2017 11:45 CST     WBC                       8.88 x10(3)/mcL                             RBC                       4.57 x10(6)/mcL                             Hgb                       13.6 gm/dL                             Hct                       37.9 %  LOW                             Platelet                  272 x10(3)/mcL                              MCV                       82.9 fL  LOW                             MCH                       29.8 pg                             MCHC                      35.9 gm/dL  HI                             RDW                       13  NA                             MPV                       10  NA                             Abs Neut                  6.88  NA                             Neutro Auto               77.5 %  NA                             Lymph Auto                14.2 %  NA                             Mono Auto                 7.3 %  NA                             Eos Auto                  0.3 %  NA                             Abs Eos                   0.03 x10(3)/mcL  NA                             Basophil Auto             0.2 %  NA                             Abs Neutro                6.88 x10(3)/mcL  NA                             Abs Lymph                 1.26 x10(3)/mcL  NA                             Abs Mono                  0.65 x10(3)/mcL  NA                             Abs Baso                  0.02 x10(3)/mcL  NA                             IG%                       0.5 %                             IG#                       0.0400                             Sodium Lvl                122 mmol/L  LOW                             Potassium Lvl             3.3 mmol/L  LOW                             Chloride                  87 mmol/L  LOW                             CO2                       26.5 mmol/L                             Calcium Lvl               9.2 mg/dL                             Magnesium Lvl             1.6 mg/dL  LOW                             Glucose Lvl               127 mg/dL  HI                             BUN                       9 mg/dL                             Creatinine                1.00 mg/dL                             eGFR-AA                   >60 mL/min/1.73 m2  NA                             eGFR-NARENDRA                  >60 mL/min/1.73 m2  NA                              Bili Total                0.7                             Bili Direct               0.21 mg/dL  HI                             Bili Indirect             0.51 mg/dL  NA                             AST                       24 unit/L                             ALT                       22 unit/L                             Alk Phos                  86 unit/L                             Total Protein             7.9 gm/dL                             Albumin Lvl               4.0 gm/dL                             Globulin                  3.9 gm/dL  HI                             A/G Ratio                 1.0 ratio  LOW                             NT pro BNP.               350.60 pg/mL  HI                             Total CK                  206 mg/dL                             CK MB                     2.2 ng/mL                             Troponin-I                <0.017 ng/mL           Discharge Information      Discharge Summary Information   Admitted  12/22/2017   Discharged  12/23/2017   Admitting physician     Matt DUTTON, Sulaiman Yi.        Physical Examination   General:  Alert and oriented, No acute distress.    Eye:  Pupils are equal, round and reactive to light, Extraocular movements are intact, Normal conjunctiva.    HENT:  Normocephalic, Tympanic membranes are clear.    Neck:  Supple, Non-tender, No carotid bruit, No jugular venous distention, No lymphadenopathy, No thyromegaly.    Respiratory:  Lungs are clear to auscultation, Breath sounds are equal, Symmetrical chest wall expansion, No chest wall tenderness.    Cardiovascular:  Normal rate, Regular rhythm, No murmur, No gallop, No edema.    Gastrointestinal:  Soft, Non-tender, Non-distended, Normal bowel sounds, No organomegaly.    Genitourinary:  No costovertebral angle tenderness.    Vital Signs   12/23/2017 13:44 CST     Temperature Oral          36.5 DegC                             Temperature Oral (calculated)              97.70 DegF                             Peripheral Pulse Rate     57 bpm  LOW                             Heart Rate Monitored      56 bpm  LOW                             Respiratory Rate          20 br/min                             SpO2                      97 %                             Oxygen Therapy            Room air                             Systolic Blood Pressure   149 mmHg  HI                             Diastolic Blood Pressure  77 mmHg                             Mean Arterial Pressure, Cuff              101 mmHg    12/23/2017 12:59 CST     24 HR Intake Totals       0 mL                             24 HR Output Totals       0 mL                             24 HR I&O Balance         0 mL    12/23/2017 12:00 CST     Temperature Oral          36.5 DegC                             Temperature Oral (calculated)             97.70 DegF                             Peripheral Pulse Rate     57 bpm  LOW                             Respiratory Rate          20 br/min                             SpO2                      97 %                             Systolic Blood Pressure   149 mmHg  HI                             Diastolic Blood Pressure  77 mmHg                             Mean Arterial Pressure, Cuff              101 mmHg    12/23/2017 8:59 CST      24 HR Intake Totals       0 mL                             24 HR Output Totals       0 mL                             24 HR I&O Balance         0 mL    12/23/2017 8:00 CST      Temperature Oral          36.5 DegC                             Temperature Oral (calculated)             97.70 DegF                             Peripheral Pulse Rate     59 bpm  LOW                             Respiratory Rate          20 br/min                             SpO2                      95 %                             Systolic Blood Pressure   155 mmHg  HI                             Diastolic Blood Pressure  90 mmHg                             Mean Arterial  Pressure, Cuff              112 mmHg    12/23/2017 7:00 CST      Oxygen Therapy            Room air    12/23/2017 6:00 CST      Oxygen Therapy            Room air    12/23/2017 4:00 CST      Temperature Oral          36.5 DegC                             Temperature Oral (calculated)             97.70 DegF                             Peripheral Pulse Rate     60 bpm                             Respiratory Rate          20 br/min                             SpO2                      95 %                             Oxygen Therapy            Room air                             Systolic Blood Pressure   134 mmHg                             Diastolic Blood Pressure  69 mmHg    12/23/2017 2:00 CST      Oxygen Therapy            Room air    12/23/2017 0:00 CST      Temperature Oral          36.7 DegC                             Temperature Oral (calculated)             98.06 DegF                             Peripheral Pulse Rate     65 bpm                             Respiratory Rate          20 br/min                             SpO2                      96 %                             Oxygen Therapy            Room air                             Systolic Blood Pressure   127 mmHg                             Diastolic Blood Pressure  79 mmHg    12/22/2017 22:00 CST     Oxygen Therapy            Room air    12/22/2017 20:00 CST     Temperature Oral          36.8 DegC                             Temperature Oral (calculated)             98.24 DegF                             Peripheral Pulse Rate     65 bpm                             Respiratory Rate          20 br/min                             SpO2                      96 %                             Oxygen Therapy            Room air                             Systolic Blood Pressure   133 mmHg                             Diastolic Blood Pressure  76 mmHg    12/22/2017 16:00 CST     Temperature Oral          36.5 DegC                             Temperature Oral  (calculated)             97.70 DegF                             Heart Rate Monitored      56 bpm  LOW                             Respiratory Rate          20 br/min                             SpO2                      96 %                             Systolic Blood Pressure   141 mmHg  HI                             Diastolic Blood Pressure  74 mmHg    12/22/2017 14:24 CST     Temperature Oral          36.4 DegC                             Temperature Oral (calculated)             97.52 DegF                             Heart Rate Monitored      62 bpm                             Respiratory Rate          20 br/min                             SpO2                      98 %                             Oxygen Therapy            Room air                             Systolic Blood Pressure   137 mmHg                             Systolic Blood Pressure   151 mmHg  HI                             Diastolic Blood Pressure  83 mmHg                             Diastolic Blood Pressure  79 mmHg                             Mean Arterial Pressure, Cuff              101 mmHg                             Mean Arterial Pressure, Cuff              103 mmHg    12/22/2017 14:00 CST     Peripheral Pulse Rate     69 bpm                             Heart Rate Monitored      58 bpm  LOW                             Respiratory Rate          17 br/min                             SpO2                      99 %                             Systolic Blood Pressure   141 mmHg  HI                             Diastolic Blood Pressure  83 mmHg                             Mean Arterial Pressure, Cuff              102 mmHg    12/22/2017 13:30 CST     Peripheral Pulse Rate     62 bpm                             Heart Rate Monitored      60 bpm                             Respiratory Rate          18 br/min                             SpO2                      99 %  (Modified)                            Systolic Blood Pressure   129 mmHg                              Diastolic Blood Pressure  78 mmHg                             Mean Arterial Pressure, Cuff              95 mmHg    12/22/2017 13:00 CST     Peripheral Pulse Rate     58 bpm  LOW                             Heart Rate Monitored      58 bpm  LOW                             Respiratory Rate          19 br/min                             SpO2                      100 %                             Systolic Blood Pressure   143 mmHg  HI                             Diastolic Blood Pressure  81 mmHg                             Mean Arterial Pressure, Cuff              102 mmHg    12/22/2017 12:30 CST     Peripheral Pulse Rate     59 bpm  LOW                             Heart Rate Monitored      59 bpm  LOW                             Respiratory Rate          18 br/min                             SpO2                      100 %                             Systolic Blood Pressure   152 mmHg  HI                             Diastolic Blood Pressure  83 mmHg                             Mean Arterial Pressure, Cuff              106 mmHg    12/22/2017 12:05 CST     Peripheral Pulse Rate     60 bpm                             Respiratory Rate          18 br/min                             SpO2                      100 %                             Oxygen Therapy            Nasal cannula  (Modified)                            Oxygen Flow Rate          2 L/min                             Systolic Blood Pressure   150 mmHg  HI                             Diastolic Blood Pressure  98 mmHg  HI    12/22/2017 11:53 CST     Respiratory Rate          20 br/min    12/22/2017 11:41 CST     Systolic Blood Pressure   217 mmHg  HI                             Diastolic Blood Pressure  119 mmHg  HI    12/22/2017 11:40 CST     Oxygen Therapy            Nasal cannula  (Modified)                            Oxygen Flow Rate          2 L/min    12/22/2017 11:36 CST     Temperature Oral          36.8 DegC                              Temperature Oral (calculated)             98.24 DegF                             Peripheral Pulse Rate     63 bpm                             Respiratory Rate          19 br/min                             SpO2                      97 %                             Oxygen Therapy            Room air                             Systolic Blood Pressure   217 mmHg  HI                             Diastolic Blood Pressure  119 mmHg  HI    12/22/2017 10:47 CST     Temperature Tympanic      36.0 DegC                             Peripheral Pulse Rate     68 bpm                             Respiratory Rate          18 br/min                             Systolic Blood Pressure   130 mmHg                             Diastolic Blood Pressure  72 mmHg        Vital Signs (last 24 hrs)_____  Last Charted___________  Temp Oral     36.5 DegC  (DEC 23 13:44)  Heart Rate Peripheral   L 57bpm  (DEC 23 13:44)  Resp Rate         20 br/min  (DEC 23 13:44)  SBP      H 149mmHg  (DEC 23 13:44)  DBP      77 mmHg  (DEC 23 13:44)  SpO2      97 %  (DEC 23 13:44)     Lymphatics:  No lymphadenopathy neck, axilla, groin.    Musculoskeletal:  Normal range of motion, Normal strength, No swelling.    Integumentary:  Warm.    Neurologic:  Alert, Oriented, Normal motor function, No focal deficits, Cranial Nerves II-XII are grossly intact, Normal deep tendon reflexes.    Psychiatric:  Cooperative, Non-suicidal.       Hospital Course   Hospital Course   Admitted from: from emergency department.     Transferred via: by car.     Admitting diagnosis: Chest pain (KYP30-RN R07.9), Hypokalemia (NZB91-WY E87.6), Hyponatremia (BOM76-PO E87.1).     Admission disposition: admit to medical bed.       HPI:       The patient presents with Nausea and chest pain.  This is a 72-year-old white male who presented to the clinic on the morning of 12/22/17 due to nausea and left-sided chest pain.  Patient states nausea began last p.m. and woke up this morning with continued  nausea.  He was also reporting some left-sided chest pain that was radiating into his left arm associated with numbness.  Patient sent to ER for further evaluation.  Workup in ER revealed patient to be hyponatremic as well as hypokalemic.  Patient will be admitted on telemetry and serial cardiac markers will be drawn out every 8 hours ×3.  We will replace electrolytes and repeat lab work in a.m..           Hospital course : 71 y/o wm admitted for a chest pain and hyponatremia .  ACS was r/o  . The pt was started on  ns by the admitting MD . The Na level went up form  122 to 134 next days . The pt sx resolved  . The pt was advised to stop  HCTZ due to  possible be the cause of the hyponatremia . pt was seen and  examined at bedside . He is aao x 3 in nad and hemodynamically stable  . he was determined to be suitable for d/c .         D/C summary time :> 35 min       Discharge Plan   Discharge Summary Plan   Discharge Status: improved.     Discharge instructions given: to patient.     Discharge disposition: discharge to home (into the care of family member, self care).     Education and Follow-up   Counseled: patient, family, regarding diagnosis, regarding treatment, regarding medications.     Discharge Planning: Chest Wall Pain, Chest Pain Observation, Cardiac-Specific Troponin I and T Test, Aspirin and Your Heart, Allergic Rhinitis, Report to Emergency Department if symptoms return or worsen; Anytime the conditions worsen, return to clinic or go to ED; Sulaiman Gutierrez In 1 week Call office to schedule follow-up appointment with Dr Gutierrez on Tuesday morning; Curt Briceno.

## 2022-06-20 DIAGNOSIS — J45.909 ASTHMA DUE TO SEASONAL ALLERGIES: Primary | ICD-10-CM

## 2022-06-20 RX ORDER — MONTELUKAST SODIUM 10 MG/1
TABLET ORAL
Qty: 30 TABLET | Refills: 3 | Status: SHIPPED | OUTPATIENT
Start: 2022-06-20 | End: 2022-07-11

## 2022-06-20 RX ORDER — MONTELUKAST SODIUM 10 MG/1
TABLET ORAL
COMMUNITY
Start: 2022-03-07 | End: 2022-06-20 | Stop reason: SDUPTHER

## 2022-06-20 RX ORDER — MONTELUKAST SODIUM 10 MG/1
TABLET ORAL
Qty: 30 TABLET | Refills: 3 | Status: SHIPPED | OUTPATIENT
Start: 2022-06-20 | End: 2022-06-20 | Stop reason: SDUPTHER

## 2022-08-30 DIAGNOSIS — R07.9 CHEST PAIN, UNSPECIFIED TYPE: Primary | ICD-10-CM

## 2022-08-30 RX ORDER — NITROGLYCERIN 0.4 MG/1
0.4 TABLET SUBLINGUAL EVERY 5 MIN PRN
Qty: 60 TABLET | Refills: 3 | Status: SHIPPED | OUTPATIENT
Start: 2022-08-30 | End: 2024-01-05 | Stop reason: SDUPTHER

## 2022-08-30 RX ORDER — NITROGLYCERIN 0.4 MG/1
0.4 TABLET SUBLINGUAL EVERY 5 MIN PRN
COMMUNITY
End: 2022-08-30 | Stop reason: SDUPTHER

## 2022-08-30 NOTE — TELEPHONE ENCOUNTER
----- Message from Sulaiman Gutierrez MD sent at 8/30/2022  3:54 PM CDT -----  Please refill  ----- Message -----  From: Lety Joshi LPN  Sent: 8/30/2022   3:28 PM CDT  To: Sulaiman Gutierrez MD    Patient requesting refill of nitroglycerine 0.4 mg. Looking through the chart it looks like the last time it was refilled was 2018. Do you need to see patient again or would you like to refill? Thanks

## 2022-10-12 DIAGNOSIS — G62.9 NEUROPATHY: Primary | ICD-10-CM

## 2022-10-12 RX ORDER — GABAPENTIN 800 MG/1
800 TABLET ORAL 3 TIMES DAILY
Qty: 180 TABLET | Refills: 1 | Status: SHIPPED | OUTPATIENT
Start: 2022-10-12 | End: 2023-04-20 | Stop reason: SDUPTHER

## 2022-10-12 RX ORDER — GABAPENTIN 800 MG/1
800 TABLET ORAL 3 TIMES DAILY
COMMUNITY
Start: 2022-09-15 | End: 2022-10-12 | Stop reason: SDUPTHER

## 2022-11-18 DIAGNOSIS — Z00.00 WELLNESS EXAMINATION: Primary | ICD-10-CM

## 2022-11-18 DIAGNOSIS — Z12.5 SCREENING FOR MALIGNANT NEOPLASM OF PROSTATE: ICD-10-CM

## 2022-11-18 DIAGNOSIS — Z11.59 NEED FOR HEPATITIS C SCREENING TEST: ICD-10-CM

## 2022-11-18 DIAGNOSIS — Z11.4 ENCOUNTER FOR SCREENING FOR HIV: ICD-10-CM

## 2022-11-18 DIAGNOSIS — Z13.6 SCREENING FOR ISCHEMIC HEART DISEASE: ICD-10-CM

## 2022-12-15 ENCOUNTER — LAB VISIT (OUTPATIENT)
Dept: LAB | Facility: HOSPITAL | Age: 77
End: 2022-12-15
Attending: FAMILY MEDICINE
Payer: MEDICARE

## 2022-12-15 DIAGNOSIS — Z11.59 NEED FOR HEPATITIS C SCREENING TEST: ICD-10-CM

## 2022-12-15 DIAGNOSIS — Z13.6 SCREENING FOR ISCHEMIC HEART DISEASE: ICD-10-CM

## 2022-12-15 DIAGNOSIS — Z00.00 WELLNESS EXAMINATION: ICD-10-CM

## 2022-12-15 DIAGNOSIS — Z12.5 SCREENING FOR MALIGNANT NEOPLASM OF PROSTATE: ICD-10-CM

## 2022-12-15 DIAGNOSIS — Z11.4 ENCOUNTER FOR SCREENING FOR HIV: ICD-10-CM

## 2022-12-15 LAB
ALBUMIN SERPL-MCNC: 3.7 G/DL (ref 3.4–4.8)
ALBUMIN/GLOB SERPL: 1 RATIO (ref 1.1–2)
ALP SERPL-CCNC: 86 UNIT/L (ref 40–150)
ALT SERPL-CCNC: 11 UNIT/L (ref 0–55)
AST SERPL-CCNC: 17 UNIT/L (ref 5–34)
BASOPHILS # BLD AUTO: 0.03 X10(3)/MCL (ref 0–0.2)
BASOPHILS NFR BLD AUTO: 0.4 %
BILIRUBIN DIRECT+TOT PNL SERPL-MCNC: 0.8 MG/DL
BUN SERPL-MCNC: 8 MG/DL (ref 8.4–25.7)
CALCIUM SERPL-MCNC: 10.1 MG/DL (ref 8.8–10)
CHLORIDE SERPL-SCNC: 98 MMOL/L (ref 98–107)
CHOLEST SERPL-MCNC: 125 MG/DL
CHOLEST/HDLC SERPL: 4 {RATIO} (ref 0–5)
CO2 SERPL-SCNC: 26 MMOL/L (ref 23–31)
CREAT SERPL-MCNC: 0.98 MG/DL (ref 0.73–1.18)
EOSINOPHIL # BLD AUTO: 0.17 X10(3)/MCL (ref 0–0.9)
EOSINOPHIL NFR BLD AUTO: 2.4 %
ERYTHROCYTE [DISTWIDTH] IN BLOOD BY AUTOMATED COUNT: 14.6 % (ref 11.6–14.4)
GFR SERPLBLD CREATININE-BSD FMLA CKD-EPI: >60 MLS/MIN/1.73/M2
GLOBULIN SER-MCNC: 3.8 GM/DL (ref 2.4–3.5)
GLUCOSE SERPL-MCNC: 97 MG/DL (ref 82–115)
HCT VFR BLD AUTO: 39 % (ref 42–52)
HCV AB SERPL QL IA: NONREACTIVE
HDLC SERPL-MCNC: 35 MG/DL (ref 35–60)
HGB BLD-MCNC: 12.9 GM/DL (ref 14–18)
HIV 1+2 AB+HIV1 P24 AG SERPL QL IA: NONREACTIVE
IMM GRANULOCYTES # BLD AUTO: 0.03 X10(3)/MCL (ref 0–0.04)
IMM GRANULOCYTES NFR BLD AUTO: 0.4 %
LDLC SERPL CALC-MCNC: 73 MG/DL (ref 50–140)
LYMPHOCYTES # BLD AUTO: 1.17 X10(3)/MCL (ref 0.6–4.6)
LYMPHOCYTES NFR BLD AUTO: 16.8 %
MCH RBC QN AUTO: 27.7 PG
MCHC RBC AUTO-ENTMCNC: 33.1 MG/DL (ref 33–36)
MCV RBC AUTO: 83.7 FL (ref 80–94)
MONOCYTES # BLD AUTO: 0.47 X10(3)/MCL (ref 0.1–1.3)
MONOCYTES NFR BLD AUTO: 6.7 %
NEUTROPHILS # BLD AUTO: 5.1 X10(3)/MCL (ref 2.1–9.2)
NEUTROPHILS NFR BLD AUTO: 73.3 %
NRBC BLD AUTO-RTO: 0 % (ref 0–1)
PLATELET # BLD AUTO: 231 X10(3)/MCL (ref 140–371)
PMV BLD AUTO: 10.2 FL (ref 9.4–12.4)
POTASSIUM SERPL-SCNC: 4.3 MMOL/L (ref 3.5–5.1)
PROT SERPL-MCNC: 7.5 GM/DL (ref 5.8–7.6)
PSA SERPL-MCNC: 0.78 NG/ML
RBC # BLD AUTO: 4.66 X10(6)/MCL (ref 4.7–6.1)
SODIUM SERPL-SCNC: 133 MMOL/L (ref 136–145)
TRIGL SERPL-MCNC: 87 MG/DL (ref 34–140)
VLDLC SERPL CALC-MCNC: 17 MG/DL
WBC # SPEC AUTO: 7 X10(3)/MCL (ref 4.5–11.5)

## 2022-12-15 PROCEDURE — 36415 COLL VENOUS BLD VENIPUNCTURE: CPT

## 2022-12-15 PROCEDURE — 87389 HIV-1 AG W/HIV-1&-2 AB AG IA: CPT

## 2022-12-15 PROCEDURE — 85025 COMPLETE CBC W/AUTO DIFF WBC: CPT

## 2022-12-15 PROCEDURE — 84153 ASSAY OF PSA TOTAL: CPT

## 2022-12-15 PROCEDURE — 80061 LIPID PANEL: CPT

## 2022-12-15 PROCEDURE — 86803 HEPATITIS C AB TEST: CPT

## 2022-12-15 PROCEDURE — 80053 COMPREHEN METABOLIC PANEL: CPT

## 2022-12-19 ENCOUNTER — OFFICE VISIT (OUTPATIENT)
Dept: FAMILY MEDICINE | Facility: CLINIC | Age: 77
End: 2022-12-19
Payer: MEDICARE

## 2022-12-19 VITALS
RESPIRATION RATE: 18 BRPM | OXYGEN SATURATION: 98 % | HEIGHT: 67 IN | HEART RATE: 70 BPM | BODY MASS INDEX: 25.29 KG/M2 | TEMPERATURE: 97 F | SYSTOLIC BLOOD PRESSURE: 122 MMHG | WEIGHT: 161.13 LBS | DIASTOLIC BLOOD PRESSURE: 68 MMHG

## 2022-12-19 DIAGNOSIS — Z00.00 WELLNESS EXAMINATION: Primary | ICD-10-CM

## 2022-12-19 DIAGNOSIS — K21.9 GASTROESOPHAGEAL REFLUX DISEASE, UNSPECIFIED WHETHER ESOPHAGITIS PRESENT: ICD-10-CM

## 2022-12-19 DIAGNOSIS — R05.9 COUGH, UNSPECIFIED TYPE: ICD-10-CM

## 2022-12-19 PROCEDURE — G0439 PR MEDICARE ANNUAL WELLNESS SUBSEQUENT VISIT: ICD-10-PCS | Mod: ,,, | Performed by: FAMILY MEDICINE

## 2022-12-19 PROCEDURE — G0439 PPPS, SUBSEQ VISIT: HCPCS | Mod: ,,, | Performed by: FAMILY MEDICINE

## 2022-12-19 RX ORDER — PANTOPRAZOLE SODIUM 40 MG/1
40 TABLET, DELAYED RELEASE ORAL DAILY
Qty: 30 TABLET | Refills: 1 | Status: SHIPPED | OUTPATIENT
Start: 2022-12-19 | End: 2023-02-20

## 2022-12-19 RX ORDER — PROMETHAZINE HYDROCHLORIDE AND DEXTROMETHORPHAN HYDROBROMIDE 6.25; 15 MG/5ML; MG/5ML
5 SYRUP ORAL EVERY 4 HOURS PRN
Qty: 200 ML | Refills: 0 | Status: SHIPPED | OUTPATIENT
Start: 2022-12-19 | End: 2022-12-29

## 2023-02-13 DIAGNOSIS — R09.81 SINUS CONGESTION: Primary | ICD-10-CM

## 2023-02-13 RX ORDER — FLUTICASONE PROPIONATE 50 MCG
1 SPRAY, SUSPENSION (ML) NASAL
COMMUNITY
End: 2023-02-13 | Stop reason: SDUPTHER

## 2023-02-13 RX ORDER — FLUTICASONE PROPIONATE 50 MCG
1 SPRAY, SUSPENSION (ML) NASAL DAILY
Qty: 18.2 ML | Refills: 2 | Status: SHIPPED | OUTPATIENT
Start: 2023-02-13 | End: 2023-09-14

## 2023-02-19 DIAGNOSIS — K21.9 GASTROESOPHAGEAL REFLUX DISEASE, UNSPECIFIED WHETHER ESOPHAGITIS PRESENT: ICD-10-CM

## 2023-02-20 RX ORDER — PANTOPRAZOLE SODIUM 40 MG/1
TABLET, DELAYED RELEASE ORAL
Qty: 90 TABLET | Refills: 1 | Status: SHIPPED | OUTPATIENT
Start: 2023-02-20 | End: 2023-02-20 | Stop reason: SDUPTHER

## 2023-02-20 RX ORDER — PANTOPRAZOLE SODIUM 40 MG/1
40 TABLET, DELAYED RELEASE ORAL DAILY
Qty: 90 TABLET | Refills: 1 | Status: SHIPPED | OUTPATIENT
Start: 2023-02-20

## 2023-04-17 DIAGNOSIS — I10 HYPERTENSION, UNSPECIFIED TYPE: Primary | ICD-10-CM

## 2023-04-17 RX ORDER — LISINOPRIL AND HYDROCHLOROTHIAZIDE 12.5; 2 MG/1; MG/1
1 TABLET ORAL DAILY
Qty: 90 TABLET | Refills: 3 | Status: SHIPPED | OUTPATIENT
Start: 2023-04-17 | End: 2024-01-05 | Stop reason: SDUPTHER

## 2023-04-20 DIAGNOSIS — G62.9 NEUROPATHY: ICD-10-CM

## 2023-04-20 DIAGNOSIS — J45.909 ASTHMA DUE TO SEASONAL ALLERGIES: ICD-10-CM

## 2023-04-20 RX ORDER — GABAPENTIN 800 MG/1
800 TABLET ORAL 3 TIMES DAILY
Qty: 180 TABLET | Refills: 1 | Status: SHIPPED | OUTPATIENT
Start: 2023-04-20 | End: 2023-04-20 | Stop reason: SDUPTHER

## 2023-04-20 RX ORDER — MONTELUKAST SODIUM 10 MG/1
TABLET ORAL
Qty: 30 TABLET | Refills: 3 | Status: SHIPPED | OUTPATIENT
Start: 2023-04-20 | End: 2023-07-03 | Stop reason: SDUPTHER

## 2023-04-20 RX ORDER — GABAPENTIN 800 MG/1
800 TABLET ORAL 3 TIMES DAILY
Qty: 180 TABLET | Refills: 1 | Status: SHIPPED | OUTPATIENT
Start: 2023-04-20 | End: 2024-01-05 | Stop reason: SDUPTHER

## 2023-07-03 DIAGNOSIS — J45.909 ASTHMA DUE TO SEASONAL ALLERGIES: ICD-10-CM

## 2023-07-03 RX ORDER — MONTELUKAST SODIUM 10 MG/1
TABLET ORAL
Qty: 30 TABLET | Refills: 3 | Status: SHIPPED | OUTPATIENT
Start: 2023-07-03 | End: 2023-09-27

## 2023-09-14 ENCOUNTER — HOSPITAL ENCOUNTER (OUTPATIENT)
Dept: RADIOLOGY | Facility: HOSPITAL | Age: 78
Discharge: HOME | End: 2023-09-14
Attending: FAMILY MEDICINE
Payer: MEDICARE

## 2023-09-14 ENCOUNTER — OFFICE VISIT (OUTPATIENT)
Dept: FAMILY MEDICINE | Facility: CLINIC | Age: 78
End: 2023-09-14
Payer: MEDICARE

## 2023-09-14 ENCOUNTER — TELEPHONE (OUTPATIENT)
Dept: FAMILY MEDICINE | Facility: CLINIC | Age: 78
End: 2023-09-14

## 2023-09-14 VITALS
HEART RATE: 64 BPM | RESPIRATION RATE: 18 BRPM | HEIGHT: 67 IN | DIASTOLIC BLOOD PRESSURE: 64 MMHG | TEMPERATURE: 97 F | SYSTOLIC BLOOD PRESSURE: 134 MMHG | OXYGEN SATURATION: 95 % | WEIGHT: 159 LBS | BODY MASS INDEX: 24.96 KG/M2

## 2023-09-14 DIAGNOSIS — G47.33 OSA (OBSTRUCTIVE SLEEP APNEA): Primary | ICD-10-CM

## 2023-09-14 DIAGNOSIS — I25.118 ATHEROSCLEROTIC HEART DISEASE OF NATIVE CORONARY ARTERY WITH OTHER FORMS OF ANGINA PECTORIS: ICD-10-CM

## 2023-09-14 DIAGNOSIS — R60.0 LEG EDEMA, LEFT: ICD-10-CM

## 2023-09-14 PROCEDURE — 1160F PR REVIEW ALL MEDS BY PRESCRIBER/CLIN PHARMACIST DOCUMENTED: ICD-10-PCS | Mod: CPTII,,, | Performed by: FAMILY MEDICINE

## 2023-09-14 PROCEDURE — 3078F PR MOST RECENT DIASTOLIC BLOOD PRESSURE < 80 MM HG: ICD-10-PCS | Mod: CPTII,,, | Performed by: FAMILY MEDICINE

## 2023-09-14 PROCEDURE — 3078F DIAST BP <80 MM HG: CPT | Mod: CPTII,,, | Performed by: FAMILY MEDICINE

## 2023-09-14 PROCEDURE — 99214 PR OFFICE/OUTPT VISIT, EST, LEVL IV, 30-39 MIN: ICD-10-PCS | Mod: ,,, | Performed by: FAMILY MEDICINE

## 2023-09-14 PROCEDURE — 3288F PR FALLS RISK ASSESSMENT DOCUMENTED: ICD-10-PCS | Mod: CPTII,,, | Performed by: FAMILY MEDICINE

## 2023-09-14 PROCEDURE — 3075F SYST BP GE 130 - 139MM HG: CPT | Mod: CPTII,,, | Performed by: FAMILY MEDICINE

## 2023-09-14 PROCEDURE — 1159F MED LIST DOCD IN RCRD: CPT | Mod: CPTII,,, | Performed by: FAMILY MEDICINE

## 2023-09-14 PROCEDURE — 1101F PT FALLS ASSESS-DOCD LE1/YR: CPT | Mod: CPTII,,, | Performed by: FAMILY MEDICINE

## 2023-09-14 PROCEDURE — 93971 EXTREMITY STUDY: CPT | Mod: TC,LT

## 2023-09-14 PROCEDURE — 1159F PR MEDICATION LIST DOCUMENTED IN MEDICAL RECORD: ICD-10-PCS | Mod: CPTII,,, | Performed by: FAMILY MEDICINE

## 2023-09-14 PROCEDURE — 3075F PR MOST RECENT SYSTOLIC BLOOD PRESS GE 130-139MM HG: ICD-10-PCS | Mod: CPTII,,, | Performed by: FAMILY MEDICINE

## 2023-09-14 PROCEDURE — 1101F PR PT FALLS ASSESS DOC 0-1 FALLS W/OUT INJ PAST YR: ICD-10-PCS | Mod: CPTII,,, | Performed by: FAMILY MEDICINE

## 2023-09-14 PROCEDURE — 1160F RVW MEDS BY RX/DR IN RCRD: CPT | Mod: CPTII,,, | Performed by: FAMILY MEDICINE

## 2023-09-14 PROCEDURE — 99214 OFFICE O/P EST MOD 30 MIN: CPT | Mod: ,,, | Performed by: FAMILY MEDICINE

## 2023-09-14 PROCEDURE — 3288F FALL RISK ASSESSMENT DOCD: CPT | Mod: CPTII,,, | Performed by: FAMILY MEDICINE

## 2023-09-14 RX ORDER — CLOPIDOGREL BISULFATE 75 MG/1
75 TABLET ORAL
COMMUNITY
Start: 2023-09-02

## 2023-09-14 RX ORDER — SIMVASTATIN 10 MG/1
10 TABLET, FILM COATED ORAL
COMMUNITY
Start: 2023-09-02 | End: 2024-01-05 | Stop reason: SDUPTHER

## 2023-09-14 NOTE — TELEPHONE ENCOUNTER
----- Message from Sulaiman Gutierrez MD sent at 9/14/2023  3:21 PM CDT -----  Please inform patient of ultrasound results, which are all within acceptable ranges.

## 2023-09-14 NOTE — PROGRESS NOTES
"Subjective:       Patient ID: Ferny Archuleta is a 78 y.o. male.    Chief Complaint: CPAP Supplies and S/p FALL secondary to dizziness x couple days ago      Routine        Review of Systems   Constitutional: Negative.    Respiratory: Negative.          JACQUELYN: hx of prior CPAP, machine recalled   Cardiovascular: Negative.    Gastrointestinal: Negative.    Musculoskeletal:         Fall: occurred at home while rising from sitting position    Left leg swelling, soreness   Neurological:  Positive for dizziness (occurred while standing up).   Psychiatric/Behavioral: Negative.             Objective:      /64 (BP Location: Left arm, Patient Position: Sitting, BP Method: Large (Manual))   Pulse 64   Temp 97.3 °F (36.3 °C)   Resp 18   Ht 5' 7" (1.702 m)   Wt 72.1 kg (159 lb)   SpO2 95%   BMI 24.90 kg/m²    Physical Exam  Constitutional:       Appearance: Normal appearance.   Cardiovascular:      Rate and Rhythm: Normal rate and regular rhythm.      Heart sounds: Normal heart sounds.   Pulmonary:      Effort: Pulmonary effort is normal.      Breath sounds: Normal breath sounds.   Musculoskeletal:      Comments: Left leg: edema present, ? Mainor's   Neurological:      Mental Status: He is alert.   Psychiatric:         Mood and Affect: Mood normal.         Behavior: Behavior normal.         Thought Content: Thought content normal.         Judgment: Judgment normal.               Assessment:       Problem List Items Addressed This Visit          Cardiac/Vascular    Atherosclerotic heart disease of native coronary artery with other forms of angina pectoris    Current Assessment & Plan     Followed by Dr. Valdivia   Continue current medication          Other Visit Diagnoses       JACQUELYN (obstructive sleep apnea)    -  Primary    Relevant Orders    Ambulatory referral/consult to Sleep Disorders    Leg edema, left        Relevant Orders    CV Ultrasound doppler venous DVT leg left               Plan:   1. JACQUELYN (obstructive " sleep apnea)  -     Ambulatory referral/consult to Sleep Disorders; Future; Expected date: 09/21/2023  Refer patient to Dr Fam    2. Leg edema, left  -     CV Ultrasound doppler venous DVT leg left; Future  Schedule DVT U/S  ER precautions  Monitor  Return to clinic with any concerns    3. Atherosclerotic heart disease of native coronary artery with other forms of angina pectoris  Assessment & Plan:  Followed by Dr. Valdivia   Continue current medication

## 2023-09-22 ENCOUNTER — OFFICE VISIT (OUTPATIENT)
Dept: NEUROLOGY | Facility: CLINIC | Age: 78
End: 2023-09-22
Payer: MEDICARE

## 2023-09-22 VITALS
WEIGHT: 158 LBS | DIASTOLIC BLOOD PRESSURE: 86 MMHG | BODY MASS INDEX: 24.8 KG/M2 | SYSTOLIC BLOOD PRESSURE: 124 MMHG | HEIGHT: 67 IN

## 2023-09-22 DIAGNOSIS — G47.33 OSA (OBSTRUCTIVE SLEEP APNEA): Primary | ICD-10-CM

## 2023-09-22 PROCEDURE — 99999 PR PBB SHADOW E&M-EST. PATIENT-LVL III: ICD-10-PCS | Mod: PBBFAC,,, | Performed by: SPECIALIST

## 2023-09-22 PROCEDURE — 1160F PR REVIEW ALL MEDS BY PRESCRIBER/CLIN PHARMACIST DOCUMENTED: ICD-10-PCS | Mod: CPTII,S$GLB,, | Performed by: SPECIALIST

## 2023-09-22 PROCEDURE — 1159F PR MEDICATION LIST DOCUMENTED IN MEDICAL RECORD: ICD-10-PCS | Mod: CPTII,S$GLB,, | Performed by: SPECIALIST

## 2023-09-22 PROCEDURE — 3074F SYST BP LT 130 MM HG: CPT | Mod: CPTII,S$GLB,, | Performed by: SPECIALIST

## 2023-09-22 PROCEDURE — 1159F MED LIST DOCD IN RCRD: CPT | Mod: CPTII,S$GLB,, | Performed by: SPECIALIST

## 2023-09-22 PROCEDURE — 99999 PR PBB SHADOW E&M-EST. PATIENT-LVL III: CPT | Mod: PBBFAC,,, | Performed by: SPECIALIST

## 2023-09-22 PROCEDURE — 99204 OFFICE O/P NEW MOD 45 MIN: CPT | Mod: S$GLB,,, | Performed by: SPECIALIST

## 2023-09-22 PROCEDURE — 99204 PR OFFICE/OUTPT VISIT, NEW, LEVL IV, 45-59 MIN: ICD-10-PCS | Mod: S$GLB,,, | Performed by: SPECIALIST

## 2023-09-22 PROCEDURE — 1160F RVW MEDS BY RX/DR IN RCRD: CPT | Mod: CPTII,S$GLB,, | Performed by: SPECIALIST

## 2023-09-22 PROCEDURE — 3079F PR MOST RECENT DIASTOLIC BLOOD PRESSURE 80-89 MM HG: ICD-10-PCS | Mod: CPTII,S$GLB,, | Performed by: SPECIALIST

## 2023-09-22 PROCEDURE — 3079F DIAST BP 80-89 MM HG: CPT | Mod: CPTII,S$GLB,, | Performed by: SPECIALIST

## 2023-09-22 PROCEDURE — 3074F PR MOST RECENT SYSTOLIC BLOOD PRESSURE < 130 MM HG: ICD-10-PCS | Mod: CPTII,S$GLB,, | Performed by: SPECIALIST

## 2023-09-22 NOTE — PROGRESS NOTES
"Subjective:       Patient ID: Ferny Archuleta is a 78 y.o. male.    Chief Complaint: snoring; sleep apnea evaluation; other:    HPI:            New pt for mima eval (Here for mima eval//Pt reports no diff w sleep onset, sleeps 8 hrs nightly. C/o snoring, witnessed apnea, EDS, crawling/aching in legs; some vivid dreams.   SS done in past; unsure of his DME or facility location;   he is not currently using pap but it was beneficial when he used it, machine is > 10 yrs old.   Recently had a GI endoscopy and severe apnea seen so he was encouraged to pick this up again   "A lot of times I wake up in the night choking coughing losing breath"     notes may also be on facesheet for HPI, ROS, and other sections     ROS:        9/22/2023     8:28 AM   EPWORTH SLEEPINESS SCALE   Sitting and reading 1   Watching TV 2   Sitting, inactive in a public place (e.g. a theatre or a meeting) 1   As a passenger in a car for an hour without a break 0   Lying down to rest in the afternoon when circumstances permit 2   Sitting and talking to someone 2   Sitting quietly after a lunch without alcohol 0   In a car, while stopped for a few minutes in traffic 0   Total score 8           Social History     Tobacco Use    Smoking status: Former     Current packs/day: 1.00     Average packs/day: 1 pack/day for 20.0 years (20.0 ttl pk-yrs)     Types: Cigarettes   Substance Use Topics    Alcohol use: Never      ----------------------------  Atherosclerotic heart disease of native coronary artery with other   forms of angina pectoris  Hypertension  Torn rotator cuff    Current Outpatient Medications   Medication Instructions    clopidogreL (PLAVIX) 75 mg, Oral    gabapentin (NEURONTIN) 800 mg, Oral, 3 times daily    ibuprofen (ADVIL,MOTRIN) 800 mg, Oral, 3 times daily PRN    lisinopriL-hydrochlorothiazide (PRINZIDE,ZESTORETIC) 20-12.5 mg per tablet 1 tablet, Oral, Daily    montelukast (SINGULAIR) 10 mg tablet TAKE 1 TABLET BY MOUTH EVERY EVENING    " "nitroGLYCERIN (NITROSTAT) 0.4 mg, Sublingual, Every 5 min PRN    pantoprazole (PROTONIX) 40 mg, Oral, Daily    simvastatin (ZOCOR) 10 mg, Oral         Objective:      Exam:   Visit Vitals  /86 (BP Location: Left arm, Patient Position: Sitting)   Ht 5' 7" (1.702 m)   Wt 71.7 kg (158 lb)   BMI 24.75 kg/m²     Neck Circumference: 15 in     General:   [x] Unaccompanied   [] Accompanied, by__    heart:    [x]RRR s murmur    abnl[]:     pharynx: m1[]   m2[x]   m3[]    m4[]     Neurological [x]nl  []Abnml:     Speech:  [x] []  vis fields:  [] []  EOMs:  [] []  funduscopic: [] []  Motor:   [] []  coord:   [] []  Gait:   [x] []    Neuroimaging:  []Images and imaging reports reviewed.  My comments:       Labs:  __  Lengthy discussion about the risks of untreated moderate to severe obstructive sleep apnea (JAQCUELYN).   Testing modalities/test options for sleep apnea discussed.  Potential need for treatment of JACQUELYN discussed including CPAP or Bilevel  PAP.   Alternatives to PAP discussed if PAP not ultimately tolerated.  Risks of drowsy driving discussed.  Weight loss discussed if patient is overweight.          Assessment/Plan:         ICD-10-CM ICD-9-CM   1. JACQUELYN (obstructive sleep apnea)  G47.33 327.23             Orders Placed This Encounter   Procedures    Home Sleep Study        HST ordered, and if PAP needed, Autopap can be ordered [his choice]               Narendra Fam MD  Director, Sleep center [facility], [facility]   Fellow, American Academy of Sleep Medicine; Fellow. American Academy of Neurology               "

## 2023-09-27 DIAGNOSIS — J45.909 ASTHMA DUE TO SEASONAL ALLERGIES: ICD-10-CM

## 2023-09-27 RX ORDER — MONTELUKAST SODIUM 10 MG/1
TABLET ORAL
Qty: 30 TABLET | Refills: 3 | Status: SHIPPED | OUTPATIENT
Start: 2023-09-27 | End: 2024-01-02

## 2023-10-04 ENCOUNTER — PROCEDURE VISIT (OUTPATIENT)
Dept: SLEEP MEDICINE | Facility: HOSPITAL | Age: 78
End: 2023-10-04
Attending: SPECIALIST
Payer: MEDICARE

## 2023-10-04 DIAGNOSIS — G47.33 OSA (OBSTRUCTIVE SLEEP APNEA): ICD-10-CM

## 2023-10-04 PROCEDURE — 95806 SLEEP STUDY UNATT&RESP EFFT: CPT | Mod: 26,,, | Performed by: SPECIALIST

## 2023-10-04 PROCEDURE — 95806 SLEEP STUDY UNATT&RESP EFFT: CPT

## 2023-10-04 PROCEDURE — 95806 PR SLEEP STUDY, UNATTENDED, SIMUL RECORD HR/O2 SAT/RESP FLOW/RESP EFFT: ICD-10-PCS | Mod: 26,,, | Performed by: SPECIALIST

## 2023-10-12 ENCOUNTER — OFFICE VISIT (OUTPATIENT)
Dept: NEUROLOGY | Facility: CLINIC | Age: 78
End: 2023-10-12
Payer: MEDICARE

## 2023-10-12 VITALS
BODY MASS INDEX: 24.8 KG/M2 | HEIGHT: 67 IN | DIASTOLIC BLOOD PRESSURE: 70 MMHG | SYSTOLIC BLOOD PRESSURE: 132 MMHG | WEIGHT: 158 LBS

## 2023-10-12 DIAGNOSIS — G47.33 OSA (OBSTRUCTIVE SLEEP APNEA): Primary | ICD-10-CM

## 2023-10-12 PROCEDURE — 3075F SYST BP GE 130 - 139MM HG: CPT | Mod: CPTII,S$GLB,, | Performed by: NURSE PRACTITIONER

## 2023-10-12 PROCEDURE — 3078F DIAST BP <80 MM HG: CPT | Mod: CPTII,S$GLB,, | Performed by: NURSE PRACTITIONER

## 2023-10-12 PROCEDURE — 1159F PR MEDICATION LIST DOCUMENTED IN MEDICAL RECORD: ICD-10-PCS | Mod: CPTII,S$GLB,, | Performed by: NURSE PRACTITIONER

## 2023-10-12 PROCEDURE — 99213 OFFICE O/P EST LOW 20 MIN: CPT | Mod: S$GLB,,, | Performed by: NURSE PRACTITIONER

## 2023-10-12 PROCEDURE — 3078F PR MOST RECENT DIASTOLIC BLOOD PRESSURE < 80 MM HG: ICD-10-PCS | Mod: CPTII,S$GLB,, | Performed by: NURSE PRACTITIONER

## 2023-10-12 PROCEDURE — 99999 PR PBB SHADOW E&M-EST. PATIENT-LVL III: ICD-10-PCS | Mod: PBBFAC,,, | Performed by: NURSE PRACTITIONER

## 2023-10-12 PROCEDURE — 99213 PR OFFICE/OUTPT VISIT, EST, LEVL III, 20-29 MIN: ICD-10-PCS | Mod: S$GLB,,, | Performed by: NURSE PRACTITIONER

## 2023-10-12 PROCEDURE — 99999 PR PBB SHADOW E&M-EST. PATIENT-LVL III: CPT | Mod: PBBFAC,,, | Performed by: NURSE PRACTITIONER

## 2023-10-12 PROCEDURE — 1159F MED LIST DOCD IN RCRD: CPT | Mod: CPTII,S$GLB,, | Performed by: NURSE PRACTITIONER

## 2023-10-12 PROCEDURE — 3075F PR MOST RECENT SYSTOLIC BLOOD PRESS GE 130-139MM HG: ICD-10-PCS | Mod: CPTII,S$GLB,, | Performed by: NURSE PRACTITIONER

## 2023-10-12 RX ORDER — QUINAPRIL 20 MG/1
20 TABLET ORAL
COMMUNITY
Start: 2023-09-27 | End: 2024-01-05

## 2023-10-12 NOTE — PROGRESS NOTES
"  Established JACQUELYN Patient   SUBJECTIVE:    Patient ID: Ferny Archuleta , 78 y.o.    Past Medical History:   Diagnosis Date    Atherosclerotic heart disease of native coronary artery with other forms of angina pectoris 9/14/2023    Hypertension     Torn rotator cuff        Past Surgical History:   Procedure Laterality Date    NISSEN FUNDOPLICATION         Review of patient's allergies indicates:  No Known Allergies    Chief Complaint:  Here for HST results    History of Present Illness:     Here for HST results    Sleeps 8 hrs a night and awakens 2 times due to nocturia and is able to go right back to sleep. Awakens refreshed and occs has EDS. Does not nap    Review of Systems - as per HPI, otherwise a balanced 10 systems review is negative.      Current Medications:  Current Outpatient Medications   Medication Instructions    clopidogreL (PLAVIX) 75 mg, Oral    gabapentin (NEURONTIN) 800 mg, Oral, 3 times daily    ibuprofen (ADVIL,MOTRIN) 800 mg, Oral, 3 times daily PRN    lisinopriL-hydrochlorothiazide (PRINZIDE,ZESTORETIC) 20-12.5 mg per tablet 1 tablet, Oral, Daily    montelukast (SINGULAIR) 10 mg tablet TAKE 1 TABLET BY MOUTH EVERY EVENING    nitroGLYCERIN (NITROSTAT) 0.4 mg, Sublingual, Every 5 min PRN    pantoprazole (PROTONIX) 40 mg, Oral, Daily    quinapriL (ACCUPRIL) 20 mg, Oral    simvastatin (ZOCOR) 10 mg, Oral         OBJECTIVE:    Vitals:  /70   Ht 5' 7" (1.702 m)   Wt 71.7 kg (158 lb)   BMI 24.75 kg/m²      Physical Exam:  Constitutional  he appears well-developed and well-nourished. he is well groomed.    Accompanied by - self  Appearance - well appearing, no apparent distress, unassisted  Heart - RRR auscultated without murmur  Lungs - CTA   Skin- no obvious lesions noted    Neurologic  Cortical function - The patient is alert, attentive   Speech - clear   Cranial nerves:  CN 3, 4, 6 EOMs - normal. No ptosis or lateral gaze deviation  CN 7 - no face asymmetry; normal eye closure and " smile  CN 8 - hearing is grossly normal  Motor - grossly normal  Gait - unassisted; posture upright.     Review of Data:      HST Report  ARNOL 7.7  Joshua 81%        9/22/2023     8:28 AM   EPWORTH SLEEPINESS SCALE   Sitting and reading 1   Watching TV 2   Sitting, inactive in a public place (e.g. a theatre or a meeting) 1   As a passenger in a car for an hour without a break 0   Lying down to rest in the afternoon when circumstances permit 2   Sitting and talking to someone 2   Sitting quietly after a lunch without alcohol 0   In a car, while stopped for a few minutes in traffic 0   Total score 8       Labs:  No visits with results within 3 Month(s) from this visit.   Latest known visit with results is:   Lab Visit on 12/15/2022   Component Date Value Ref Range Status    Sodium Level 12/15/2022 133 (L)  136 - 145 mmol/L Final    Potassium Level 12/15/2022 4.3  3.5 - 5.1 mmol/L Final    Chloride 12/15/2022 98  98 - 107 mmol/L Final    Carbon Dioxide 12/15/2022 26  23 - 31 mmol/L Final    Glucose Level 12/15/2022 97  82 - 115 mg/dL Final    Blood Urea Nitrogen 12/15/2022 8.0 (L)  8.4 - 25.7 mg/dL Final    Creatinine 12/15/2022 0.98  0.73 - 1.18 mg/dL Final    Calcium Level Total 12/15/2022 10.1 (H)  8.8 - 10.0 mg/dL Final    Protein Total 12/15/2022 7.5  5.8 - 7.6 gm/dL Final    Albumin Level 12/15/2022 3.7  3.4 - 4.8 g/dL Final    Globulin 12/15/2022 3.8 (H)  2.4 - 3.5 gm/dL Final    Albumin/Globulin Ratio 12/15/2022 1.0 (L)  1.1 - 2.0 ratio Final    Bilirubin Total 12/15/2022 0.8  <=1.5 mg/dL Final    Alkaline Phosphatase 12/15/2022 86  40 - 150 unit/L Final    Alanine Aminotransferase 12/15/2022 11  0 - 55 unit/L Final    Aspartate Aminotransferase 12/15/2022 17  5 - 34 unit/L Final    eGFR 12/15/2022 >60  mls/min/1.73/m2 Final    Hep C Ab Interp 12/15/2022 Nonreactive  Nonreactive Final    HIV 12/15/2022 Nonreactive  Nonreactive Final    Cholesterol Total 12/15/2022 125  <=200 mg/dL Final    HDL Cholesterol  12/15/2022 35  35 - 60 mg/dL Final    Triglyceride 12/15/2022 87  34 - 140 mg/dL Final    Cholesterol/HDL Ratio 12/15/2022 4  0 - 5 Final    Very Low Density Lipoprotein 12/15/2022 17   Final    LDL Cholesterol 12/15/2022 73.00  50.00 - 140.00 mg/dL Final    Prostate Specific Antigen 12/15/2022 0.78  <=4.00 ng/mL Final    WBC 12/15/2022 7.0  4.5 - 11.5 x10(3)/mcL Final    RBC 12/15/2022 4.66 (L)  4.70 - 6.10 x10(6)/mcL Final    Hgb 12/15/2022 12.9 (L)  14.0 - 18.0 gm/dL Final    Hct 12/15/2022 39.0 (L)  42.0 - 52.0 % Final    MCV 12/15/2022 83.7  80.0 - 94.0 fL Final    MCH 12/15/2022 27.7  pg Final    MCHC 12/15/2022 33.1  33.0 - 36.0 mg/dL Final    RDW 12/15/2022 14.6 (H)  11.6 - 14.4 % Final    Platelet 12/15/2022 231  140 - 371 x10(3)/mcL Final    MPV 12/15/2022 10.2  9.4 - 12.4 fL Final    Neut % 12/15/2022 73.3  % Final    Lymph % 12/15/2022 16.8  % Final    Mono % 12/15/2022 6.7  % Final    Eos % 12/15/2022 2.4  % Final    Basophil % 12/15/2022 0.4  % Final    Lymph # 12/15/2022 1.17  0.6 - 4.6 x10(3)/mcL Final    Neut # 12/15/2022 5.10  2.1 - 9.2 x10(3)/mcL Final    Mono # 12/15/2022 0.47  0.1 - 1.3 x10(3)/mcL Final    Eos # 12/15/2022 0.17  0 - 0.9 x10(3)/mcL Final    Baso # 12/15/2022 0.03  0 - 0.2 x10(3)/mcL Final    IG# 12/15/2022 0.03  0 - 0.04 x10(3)/mcL Final    IG% 12/15/2022 0.4  % Final    NRBC% 12/15/2022 0.0  0 - 1 % Final          ASSESSMENT /PLAN:    Problem List Items Addressed This Visit          Other    JACQUELYN (obstructive sleep apnea) - Primary    Reviewed HST reports with pt (see detailed results and interpretation scanned into chart). Suggest autoPAP with pressures from 5-20 cm.    Encouraged nightly use of PAP. Discussed minimum insurance guidelines for PAP use    Reminded pt that drowsy driving may still occur despite PAP use.    Follow up requested in 3 mo. Instructed pt to call prior if needed          Questions and concerns were sought and answered to the patient's stated verbal  satisfaction.    The patient verbalizes understanding and agreement with the above stated treatment plan.   Dr. Fam was available during today's encounter.     Items discussed include acute and/or chronic neurological, sleep, or other issues and their attendant differential diagnoses.  Potential for additional testing, treatment options, and prognosis also discussed.    __*_single dx ___multiple issues/ diagnoses  ___ low __* mod ___ high complexity of data  __*_low __mod ___ high risks     Medical Decision Making (MDM) used for CPT choice:  _*__low  ___moderate  ____high        ABHIALSH Martinez  [facility]  [phone]

## 2023-10-19 ENCOUNTER — TELEPHONE (OUTPATIENT)
Dept: NEUROLOGY | Facility: CLINIC | Age: 78
End: 2023-10-19
Payer: MEDICARE

## 2023-10-23 ENCOUNTER — TELEPHONE (OUTPATIENT)
Dept: NEUROLOGY | Facility: CLINIC | Age: 78
End: 2023-10-23
Payer: MEDICARE

## 2023-10-23 NOTE — TELEPHONE ENCOUNTER
----- Message from Dolores Pascual RRT sent at 10/23/2023 10:19 AM CDT -----  Regarding: setuip notes for autopap  Patient was setup with: Airsense 10 autooset at 5-20cm, slim line tubing,     Patient was fitted with: Airfit F20 Medium     Patient was instructed on the proper use and operation of equipment.  Patient verbalized understanding of instructions given.  Compliance guidelines reviewed with patient. We will follow as needed.

## 2023-12-22 DIAGNOSIS — Z13.6 SCREENING FOR ISCHEMIC HEART DISEASE: ICD-10-CM

## 2023-12-22 DIAGNOSIS — Z11.59 NEED FOR HEPATITIS C SCREENING TEST: ICD-10-CM

## 2023-12-22 DIAGNOSIS — Z11.4 ENCOUNTER FOR HIV (HUMAN IMMUNODEFICIENCY VIRUS) TEST: Primary | ICD-10-CM

## 2023-12-22 DIAGNOSIS — I25.118 ATHEROSCLEROTIC HEART DISEASE OF NATIVE CORONARY ARTERY WITH OTHER FORMS OF ANGINA PECTORIS: ICD-10-CM

## 2023-12-22 DIAGNOSIS — Z00.00 WELLNESS EXAMINATION: ICD-10-CM

## 2023-12-22 DIAGNOSIS — Z12.5 SCREENING FOR MALIGNANT NEOPLASM OF PROSTATE: ICD-10-CM

## 2023-12-22 DIAGNOSIS — E03.9 HYPOTHYROIDISM, UNSPECIFIED TYPE: ICD-10-CM

## 2023-12-30 DIAGNOSIS — J45.909 ASTHMA DUE TO SEASONAL ALLERGIES: ICD-10-CM

## 2024-01-02 RX ORDER — MONTELUKAST SODIUM 10 MG/1
TABLET ORAL
Qty: 30 TABLET | Refills: 3 | Status: SHIPPED | OUTPATIENT
Start: 2024-01-02

## 2024-01-02 RX ORDER — MONTELUKAST SODIUM 10 MG/1
TABLET ORAL
Qty: 30 TABLET | Refills: 3 | Status: SHIPPED | OUTPATIENT
Start: 2024-01-02 | End: 2024-01-05 | Stop reason: CLARIF

## 2024-01-03 ENCOUNTER — LAB VISIT (OUTPATIENT)
Dept: LAB | Facility: HOSPITAL | Age: 79
End: 2024-01-03
Attending: FAMILY MEDICINE
Payer: MEDICARE

## 2024-01-03 DIAGNOSIS — Z00.00 WELLNESS EXAMINATION: ICD-10-CM

## 2024-01-03 DIAGNOSIS — Z11.4 ENCOUNTER FOR HIV (HUMAN IMMUNODEFICIENCY VIRUS) TEST: ICD-10-CM

## 2024-01-03 DIAGNOSIS — I25.118 ATHEROSCLEROTIC HEART DISEASE OF NATIVE CORONARY ARTERY WITH OTHER FORMS OF ANGINA PECTORIS: ICD-10-CM

## 2024-01-03 DIAGNOSIS — Z12.5 SCREENING FOR MALIGNANT NEOPLASM OF PROSTATE: ICD-10-CM

## 2024-01-03 DIAGNOSIS — Z13.6 SCREENING FOR ISCHEMIC HEART DISEASE: ICD-10-CM

## 2024-01-03 DIAGNOSIS — Z11.59 NEED FOR HEPATITIS C SCREENING TEST: ICD-10-CM

## 2024-01-03 LAB
ALBUMIN SERPL-MCNC: 3.4 G/DL (ref 3.4–4.8)
ALBUMIN/GLOB SERPL: 0.9 RATIO (ref 1.1–2)
ALP SERPL-CCNC: 73 UNIT/L (ref 40–150)
ALT SERPL-CCNC: 10 UNIT/L (ref 0–55)
AST SERPL-CCNC: 17 UNIT/L (ref 5–34)
BASOPHILS # BLD AUTO: 0.04 X10(3)/MCL
BASOPHILS NFR BLD AUTO: 0.7 %
BILIRUB SERPL-MCNC: 0.4 MG/DL
BUN SERPL-MCNC: 6 MG/DL (ref 8.4–25.7)
CALCIUM SERPL-MCNC: 9.1 MG/DL (ref 8.8–10)
CHLORIDE SERPL-SCNC: 93 MMOL/L (ref 98–107)
CHOLEST SERPL-MCNC: 101 MG/DL
CHOLEST/HDLC SERPL: 3 {RATIO} (ref 0–5)
CO2 SERPL-SCNC: 29 MMOL/L (ref 23–31)
CREAT SERPL-MCNC: 0.82 MG/DL (ref 0.73–1.18)
EOSINOPHIL # BLD AUTO: 0.2 X10(3)/MCL (ref 0–0.9)
EOSINOPHIL NFR BLD AUTO: 3.6 %
ERYTHROCYTE [DISTWIDTH] IN BLOOD BY AUTOMATED COUNT: 12.9 % (ref 11.5–17)
GFR SERPLBLD CREATININE-BSD FMLA CKD-EPI: >60 MLS/MIN/1.73/M2
GLOBULIN SER-MCNC: 3.8 GM/DL (ref 2.4–3.5)
GLUCOSE SERPL-MCNC: 101 MG/DL (ref 82–115)
HCT VFR BLD AUTO: 37 % (ref 42–52)
HCV AB SERPL QL IA: NONREACTIVE
HDLC SERPL-MCNC: 31 MG/DL (ref 35–60)
HGB BLD-MCNC: 12.4 G/DL (ref 14–18)
HIV 1+2 AB+HIV1 P24 AG SERPL QL IA: NONREACTIVE
IMM GRANULOCYTES # BLD AUTO: 0.04 X10(3)/MCL (ref 0–0.04)
IMM GRANULOCYTES NFR BLD AUTO: 0.7 %
LDLC SERPL CALC-MCNC: 45 MG/DL (ref 50–140)
LYMPHOCYTES # BLD AUTO: 1.07 X10(3)/MCL (ref 0.6–4.6)
LYMPHOCYTES NFR BLD AUTO: 19.2 %
MCH RBC QN AUTO: 28.4 PG (ref 27–31)
MCHC RBC AUTO-ENTMCNC: 33.5 G/DL (ref 33–36)
MCV RBC AUTO: 84.9 FL (ref 80–94)
MONOCYTES # BLD AUTO: 0.39 X10(3)/MCL (ref 0.1–1.3)
MONOCYTES NFR BLD AUTO: 7 %
NEUTROPHILS # BLD AUTO: 3.83 X10(3)/MCL (ref 2.1–9.2)
NEUTROPHILS NFR BLD AUTO: 68.8 %
NRBC BLD AUTO-RTO: 0 %
PLATELET # BLD AUTO: 327 X10(3)/MCL (ref 130–400)
PMV BLD AUTO: 9.5 FL (ref 7.4–10.4)
POTASSIUM SERPL-SCNC: 3.9 MMOL/L (ref 3.5–5.1)
PROT SERPL-MCNC: 7.2 GM/DL (ref 5.8–7.6)
PSA SERPL-MCNC: 0.65 NG/ML
RBC # BLD AUTO: 4.36 X10(6)/MCL (ref 4.7–6.1)
SODIUM SERPL-SCNC: 129 MMOL/L (ref 136–145)
TRIGL SERPL-MCNC: 123 MG/DL (ref 34–140)
VLDLC SERPL CALC-MCNC: 25 MG/DL
WBC # SPEC AUTO: 5.57 X10(3)/MCL (ref 4.5–11.5)

## 2024-01-03 PROCEDURE — 85025 COMPLETE CBC W/AUTO DIFF WBC: CPT

## 2024-01-03 PROCEDURE — 80061 LIPID PANEL: CPT

## 2024-01-03 PROCEDURE — 80053 COMPREHEN METABOLIC PANEL: CPT

## 2024-01-03 PROCEDURE — 87389 HIV-1 AG W/HIV-1&-2 AB AG IA: CPT

## 2024-01-03 PROCEDURE — 36415 COLL VENOUS BLD VENIPUNCTURE: CPT

## 2024-01-03 PROCEDURE — 86803 HEPATITIS C AB TEST: CPT

## 2024-01-03 PROCEDURE — 84153 ASSAY OF PSA TOTAL: CPT

## 2024-01-05 ENCOUNTER — OFFICE VISIT (OUTPATIENT)
Dept: FAMILY MEDICINE | Facility: CLINIC | Age: 79
End: 2024-01-05
Payer: MEDICARE

## 2024-01-05 VITALS
BODY MASS INDEX: 24.64 KG/M2 | WEIGHT: 157 LBS | HEIGHT: 67 IN | OXYGEN SATURATION: 90 % | SYSTOLIC BLOOD PRESSURE: 128 MMHG | HEART RATE: 86 BPM | RESPIRATION RATE: 18 BRPM | DIASTOLIC BLOOD PRESSURE: 66 MMHG | TEMPERATURE: 98 F

## 2024-01-05 DIAGNOSIS — G62.9 NEUROPATHY: ICD-10-CM

## 2024-01-05 DIAGNOSIS — R07.9 CHEST PAIN, UNSPECIFIED TYPE: ICD-10-CM

## 2024-01-05 DIAGNOSIS — I10 HYPERTENSION, UNSPECIFIED TYPE: ICD-10-CM

## 2024-01-05 DIAGNOSIS — Z00.00 WELLNESS EXAMINATION: Primary | ICD-10-CM

## 2024-01-05 PROCEDURE — G0439 PPPS, SUBSEQ VISIT: HCPCS | Mod: ,,, | Performed by: FAMILY MEDICINE

## 2024-01-05 RX ORDER — GABAPENTIN 800 MG/1
800 TABLET ORAL 3 TIMES DAILY
Qty: 180 TABLET | Refills: 1 | Status: SHIPPED | OUTPATIENT
Start: 2024-01-05

## 2024-01-05 RX ORDER — NITROGLYCERIN 0.4 MG/1
0.4 TABLET SUBLINGUAL EVERY 5 MIN PRN
Qty: 60 TABLET | Refills: 0 | Status: SHIPPED | OUTPATIENT
Start: 2024-01-05

## 2024-01-05 RX ORDER — LISINOPRIL AND HYDROCHLOROTHIAZIDE 12.5; 2 MG/1; MG/1
1 TABLET ORAL DAILY
Qty: 90 TABLET | Refills: 1 | Status: SHIPPED | OUTPATIENT
Start: 2024-01-05 | End: 2024-07-03

## 2024-01-05 RX ORDER — SIMVASTATIN 10 MG/1
10 TABLET, FILM COATED ORAL NIGHTLY
Qty: 90 TABLET | Refills: 3 | Status: SHIPPED | OUTPATIENT
Start: 2024-01-05 | End: 2025-01-04

## 2024-01-05 NOTE — PROGRESS NOTES
Patient ID: [id number]     Chief Complaint: wellness and Cough      HPI:     Ferny Archuleta is a 78 y.o. male here today for a Medicare Wellness.       Opioid Screening: Patient medication list reviewed, patient is not taking prescription opioids. Patient is not using additional opioids than prescribed. Patient is not at low risk of substance abuse based on this opioid use history.       ----------------------------  Atherosclerotic heart disease of native coronary artery with other   forms of angina pectoris  Hypertension  Torn rotator cuff     Past Surgical History:   Procedure Laterality Date    NISSEN FUNDOPLICATION         Review of patient's allergies indicates:  No Known Allergies    Outpatient Medications Marked as Taking for the 1/5/24 encounter (Office Visit) with Sulaiman Gutierrez MD   Medication Sig Dispense Refill    clopidogreL (PLAVIX) 75 mg tablet Take 75 mg by mouth.      ibuprofen (ADVIL,MOTRIN) 800 MG tablet Take 800 mg by mouth 3 (three) times daily as needed for Pain.      montelukast (SINGULAIR) 10 mg tablet TAKE 1 TABLET BY MOUTH EVERY EVENING 30 tablet 3    pantoprazole (PROTONIX) 40 MG tablet Take 1 tablet (40 mg total) by mouth once daily. 90 tablet 1    [DISCONTINUED] gabapentin (NEURONTIN) 800 MG tablet Take 1 tablet (800 mg total) by mouth 3 (three) times daily. 180 tablet 1    [DISCONTINUED] lisinopriL-hydrochlorothiazide (PRINZIDE,ZESTORETIC) 20-12.5 mg per tablet Take 1 tablet by mouth once daily. 90 tablet 3    [DISCONTINUED] nitroGLYCERIN (NITROSTAT) 0.4 MG SL tablet Place 1 tablet (0.4 mg total) under the tongue every 5 (five) minutes as needed for Chest pain. 60 tablet 3    [DISCONTINUED] quinapriL (ACCUPRIL) 20 MG tablet Take 20 mg by mouth.      [DISCONTINUED] simvastatin (ZOCOR) 10 MG tablet Take 10 mg by mouth.         Social History     Socioeconomic History    Marital status:    Tobacco Use    Smoking status: Former     Current packs/day: 1.00     Average packs/day:  1 pack/day for 20.0 years (20.0 ttl pk-yrs)     Types: Cigarettes   Substance and Sexual Activity    Alcohol use: Never     Social Determinants of Health     Financial Resource Strain: Low Risk  (9/14/2023)    Overall Financial Resource Strain (CARDIA)     Difficulty of Paying Living Expenses: Not hard at all   Food Insecurity: No Food Insecurity (9/14/2023)    Hunger Vital Sign     Worried About Running Out of Food in the Last Year: Never true     Ran Out of Food in the Last Year: Never true   Transportation Needs: No Transportation Needs (9/14/2023)    PRAPARE - Transportation     Lack of Transportation (Medical): No     Lack of Transportation (Non-Medical): No   Physical Activity: Insufficiently Active (9/14/2023)    Exercise Vital Sign     Days of Exercise per Week: 7 days     Minutes of Exercise per Session: 20 min   Stress: No Stress Concern Present (9/14/2023)    [employer] - Occupational Stress Questionnaire     Feeling of Stress : Not at all   Social Connections: Socially Integrated (9/14/2023)    Social Connection and Isolation Panel [NHANES]     Frequency of Communication with Friends and Family: More than three times a week     Frequency of Social Gatherings with Friends and Family: More than three times a week     Attends Synagogue Services: 1 to 4 times per year     Active Member of Clubs or Organizations: No     Attends Club or Organization Meetings: 1 to 4 times per year     Marital Status:    Housing Stability: Unknown (9/14/2023)    Housing Stability Vital Sign     Unable to Pay for Housing in the Last Year: No     Unstable Housing in the Last Year: No        Family History   Problem Relation Age of Onset    Heart disease Father         Patient Care Team:  Sulaiman Gutierrez MD as PCP - General (Family Medicine)  William Valdivia MD as Consulting Physician (Cardiology)       Subjective:     Review of Systems   Constitutional: Negative.    Respiratory: Negative.      Cardiovascular: Negative.    Gastrointestinal: Negative.    Psychiatric/Behavioral: Negative.           Patient Reported Health Risk Assessment  What [age] 70-79  Are you male or female?: Male  During the past four weeks, how much have you been bothered by emotional problems such as feeling anxious, depressed, irritable, sad, or downhearted and blue?: Not at all  During the past five weeks, has your physical and/or emotional health limited your social activities with family, friends, neighbors, or groups?: Not at all  During the past four weeks, how much bodily pain have you generally had?: No pain  During the past four weeks, was someone available to help if you needed and wanted help?: Yes, a little  During the past four weeks, what was the hardest physical activity you could do for at least two minutes?: Light  Can you get to places out of walking distance without help?  (For example, can you travel alone on buses or taxis, or drive your own car?): Yes  Can you go shopping for groceries or clothes without someone's help?: Yes  Can you prepare your own meals?: Yes  Can you do your own housework without help?: Yes  Because of any health problems, do you need the help of another person with your personal care needs such as eating, bathing, dressing, or getting around the house?: No  Can you handle your own money without help?: Yes  During the past four weeks, how would you rate your health in general?: Good  How have things been going for you during the past four weeks?: Good and bad parts about equal  Are you having difficulties driving your car?: No  Do you always fasten your seat belt when you are in a car?: Yes, sometimes  How often in the past four weeks have you been bothered by falling or dizzy when standing up?: Never  How often in the past four weeks have you been bothered by sexual problems?: Never  How often in the past four weeks have you been bothered by trouble eating well?: Never  How  "often in the past four weeks have you been bothered by teeth or denture problems?: Never  How often in the past four weeks have you been bothered with problems using the telephone?: Never  How often in the past four weeks have you been bothered by tiredness or fatigue?: Never  Have you fallen two or more times in the past year?: No  Are you afraid of falling?: No  Are you a smoker?: No  During the past four weeks, how many drinks of wine, beer, or other alcoholic beverages did you have?: No alcohol at all  Do you exercise for about 20 minutes three or more days a week?: Yes, most of the time  Have you been given any information to help you with hazards in your house that might hurt you?: Yes  Have you been given any information to help you with keeping track of your medications?: Yes  How often do you have trouble taking medicines the way you've been told to take them?: I always take them as prescribed  How confident are you that you can control and manage most of your health problems?: Very confident  What is your race? (Check all that apply.):     Objective:     /66 (BP Location: Left arm, Patient Position: Sitting, BP Method: Large (Manual))   Pulse 86   Temp 97.6 °F (36.4 °C)   Resp 18   Ht 5' 7" (1.702 m)   Wt 71.2 kg (157 lb)   SpO2 (!) 90%   BMI 24.59 kg/m²     Physical Exam  Constitutional:       Appearance: Normal appearance.   Cardiovascular:      Rate and Rhythm: Normal rate and regular rhythm.   Pulmonary:      Effort: Pulmonary effort is normal.      Breath sounds: Normal breath sounds.   Abdominal:      General: Abdomen is flat. Bowel sounds are normal.      Palpations: Abdomen is soft.   Neurological:      Mental Status: He is alert.   Psychiatric:         Mood and Affect: Mood normal.         Behavior: Behavior normal.         Thought Content: Thought content normal.         Judgment: Judgment normal.       Recent Results (from the past 504 hour(s))   Comprehensive Metabolic " Panel    Collection Time: 01/03/24  8:27 AM   Result Value Ref Range    Sodium Level 129 (L) 136 - 145 mmol/L    Potassium Level 3.9 3.5 - 5.1 mmol/L    Chloride 93 (L) 98 - 107 mmol/L    Carbon Dioxide 29 23 - 31 mmol/L    Glucose Level 101 82 - 115 mg/dL    Blood Urea Nitrogen 6.0 (L) 8.4 - 25.7 mg/dL    Creatinine 0.82 0.73 - 1.18 mg/dL    Calcium Level Total 9.1 8.8 - 10.0 mg/dL    Protein Total 7.2 5.8 - 7.6 gm/dL    Albumin Level 3.4 3.4 - 4.8 g/dL    Globulin 3.8 (H) 2.4 - 3.5 gm/dL    Albumin/Globulin Ratio 0.9 (L) 1.1 - 2.0 ratio    Bilirubin Total 0.4 <=1.5 mg/dL    Alkaline Phosphatase 73 40 - 150 unit/L    Alanine Aminotransferase 10 0 - 55 unit/L    Aspartate Aminotransferase 17 5 - 34 unit/L    eGFR >60 mls/min/1.73/m2   Hepatitis C Antibody    Collection Time: 01/03/24  8:27 AM   Result Value Ref Range    Hep C Ab Interp Nonreactive Nonreactive   HIV 1/2 Ag/Ab (4th Gen)    Collection Time: 01/03/24  8:27 AM   Result Value Ref Range    HIV Nonreactive Nonreactive   Lipid Panel    Collection Time: 01/03/24  8:27 AM   Result Value Ref Range    Cholesterol Total 101 <=200 mg/dL    HDL Cholesterol 31 (L) 35 - 60 mg/dL    Triglyceride 123 34 - 140 mg/dL    Cholesterol/HDL Ratio 3 0 - 5    Very Low Density Lipoprotein 25     LDL Cholesterol 45.00 (L) 50.00 - 140.00 mg/dL   PSA, Screening    Collection Time: 01/03/24  8:27 AM   Result Value Ref Range    Prostate Specific Antigen 0.65 <=4.00 ng/mL   CBC with Differential    Collection Time: 01/03/24  8:27 AM   Result Value Ref Range    WBC 5.57 4.50 - 11.50 x10(3)/mcL    RBC 4.36 (L) 4.70 - 6.10 x10(6)/mcL    Hgb 12.4 (L) 14.0 - 18.0 g/dL    Hct 37.0 (L) 42.0 - 52.0 %    MCV 84.9 80.0 - 94.0 fL    MCH 28.4 27.0 - 31.0 pg    MCHC 33.5 33.0 - 36.0 g/dL    RDW 12.9 11.5 - 17.0 %    Platelet 327 130 - 400 x10(3)/mcL    MPV 9.5 7.4 - 10.4 fL    Neut % 68.8 %    Lymph % 19.2 %    Mono % 7.0 %    Eos % 3.6 %    Basophil % 0.7 %    Lymph # 1.07 0.6 - 4.6 x10(3)/mcL     "Neut # 3.83 2.1 - 9.2 x10(3)/mcL    Mono # 0.39 0.1 - 1.3 x10(3)/mcL    Eos # 0.20 0 - 0.9 x10(3)/mcL    Baso # 0.04 <=0.2 x10(3)/mcL    IG# 0.04 0 - 0.04 x10(3)/mcL    IG% 0.7 %    NRBC% 0.0 %               No data to display                  1/5/2024     8:30 AM 9/22/2023     8:30 AM 9/14/2023    10:45 AM 12/19/2022     8:45 AM   Fall Risk Assessment - Outpatient   Mobility Status Ambulatory Ambulatory Ambulatory Ambulatory   Number of falls 0  1 0   Identified as fall risk False  False False           Depression Screening  Over the past two weeks, has the patient felt down, depressed, or hopeless?: No  Over the past two weeks, has the patient felt little interest or pleasure in doing things?: No  Functional Ability/Safety Screening  Was the patient's timed Up & Go test unsteady or longer than 30 seconds?: No  Does the patient need help with phone, transportation, shopping, preparing meals, housework, laundry, meds, or managing money?: No  Does the patient's home have rugs in the hallway, lack grab bars in the bathroom, lack handrails on the stairs or have poor lighting?: No  Have you noticed any hearing difficulties?: No  A "Yes" response to any of the above questions should trigger further investigation.: n  Cognitive Function (Assessed through direct observation with due consideration of information obtained by way of patient reports and/or concerns raised by family, friends, caretakers, or others)    Does the patient repeat questions/statements in the same day?: No  Does the patient have trouble remembering the date, year, and time?: No  Does the patient have difficulty managing finances?: No  Does the patient have a decreased sense of direction?: No  A "Yes" response to any of the above questions could indicate mild cognitive impairment and should trigger further investigation.: n  Assessment/Plan:     1. Wellness examination  Lab work reviewed with patient  Continue current medication  Continue " diet/exercise  Advanced directive discussed with patient  Return to clinic with any concerns    Advance Care Planning     Date: 01/05/2024    Living Will  During this visit, I engaged the patient  in the voluntary advance care planning process.  The patient and I reviewed the role for advance directives and their purpose in directing future healthcare if the patient's unable to speak for him/herself.  At this point in time, the patient does have full decision-making capacity.  We discussed different extreme health states that he could experience, and reviewed what kind of medical care he would want in those situations.  The patient communicated that if he were comatose and had little chance of a meaningful recovery, he would not want machines/life-sustaining treatments used.   I spent a total of 5 minutes engaging the patient in this advance care planning discussion.     2. Neuropathy  -     gabapentin (NEURONTIN) 800 MG tablet; Take 1 tablet (800 mg total) by mouth 3 (three) times daily.  Dispense: 180 tablet; Refill: 1    3. Hypertension, unspecified type  -     lisinopriL-hydrochlorothiazide (PRINZIDE,ZESTORETIC) 20-12.5 mg per tablet; Take 1 tablet by mouth once daily.  Dispense: 90 tablet; Refill: 1    4. Chest pain, unspecified type  -     nitroGLYCERIN (NITROSTAT) 0.4 MG SL tablet; Place 1 tablet (0.4 mg total) under the tongue every 5 (five) minutes as needed for Chest pain.  Dispense: 60 tablet; Refill: 0    Other orders  -     simvastatin (ZOCOR) 10 MG tablet; Take 1 tablet (10 mg total) by mouth every evening.  Dispense: 90 tablet; Refill: 3           Medicare Annual Wellness and Personalized Prevention Plan:   Fall Risk + Home Safety + Hearing Impairment + Depression Screen + Opioid and Substance Abuse Screening + Cognitive Impairment Screen + Health Risk Assessment all reviewed.     Health Maintenance Topics with due status: Not Due       Topic Last Completion Date    Lipid Panel 01/03/2024      The  patient's Health Maintenance was reviewed and the following appears to be due at this time:   Health Maintenance Due   Topic Date Due    COVID-19 Vaccine (1) Never done    TETANUS VACCINE  Never done    Shingles Vaccine (1 of 2) Never done    RSV Vaccine (Age 60+ and Pregnant patients) (1 - 1-dose 60+ series) Never done    Pneumococcal Vaccines (Age 65+) (1 - PCV) Never done    Influenza Vaccine (1) 09/01/2023       Advance Care Planning   I attest to discussing Advance Care Planning with patient and/or family member.  Education was provided including the importance of the Health Care Power of , Advance Directives, and/or [place] documentation.  The patient expressed understanding to the importance of this information and discussion.         Follow up in about 6 months (around 7/5/2024). In addition to their scheduled follow up, the patient has also been instructed to follow up on as needed basis.

## 2024-01-09 ENCOUNTER — PATIENT MESSAGE (OUTPATIENT)
Dept: NEUROLOGY | Facility: CLINIC | Age: 79
End: 2024-01-09
Payer: MEDICARE

## 2024-03-29 DIAGNOSIS — J45.909 ASTHMA DUE TO SEASONAL ALLERGIES: ICD-10-CM

## 2024-04-01 RX ORDER — MONTELUKAST SODIUM 10 MG/1
TABLET ORAL
Qty: 30 TABLET | Refills: 3 | Status: SHIPPED | OUTPATIENT
Start: 2024-04-01

## 2024-04-15 DIAGNOSIS — K21.9 GASTROESOPHAGEAL REFLUX DISEASE, UNSPECIFIED WHETHER ESOPHAGITIS PRESENT: ICD-10-CM

## 2024-04-15 RX ORDER — PANTOPRAZOLE SODIUM 40 MG/1
40 TABLET, DELAYED RELEASE ORAL DAILY
Qty: 90 TABLET | Refills: 1 | Status: SHIPPED | OUTPATIENT
Start: 2024-04-15

## 2024-06-28 ENCOUNTER — HOSPITAL ENCOUNTER (EMERGENCY)
Facility: HOSPITAL | Age: 79
Discharge: HOME | End: 2024-06-28
Attending: EMERGENCY MEDICINE
Payer: MEDICARE

## 2024-06-28 VITALS
TEMPERATURE: 98 F | HEIGHT: 67 IN | BODY MASS INDEX: 28.88 KG/M2 | HEART RATE: 68 BPM | OXYGEN SATURATION: 100 % | SYSTOLIC BLOOD PRESSURE: 178 MMHG | WEIGHT: 184 LBS | DIASTOLIC BLOOD PRESSURE: 84 MMHG | RESPIRATION RATE: 18 BRPM

## 2024-06-28 DIAGNOSIS — R52 PAIN: ICD-10-CM

## 2024-06-28 DIAGNOSIS — S63.502A SPRAIN OF LEFT WRIST, INITIAL ENCOUNTER: Primary | ICD-10-CM

## 2024-06-28 PROCEDURE — 96372 THER/PROPH/DIAG INJ SC/IM: CPT | Performed by: EMERGENCY MEDICINE

## 2024-06-28 PROCEDURE — 63600175 PHARM REV CODE 636 W HCPCS: Performed by: EMERGENCY MEDICINE

## 2024-06-28 PROCEDURE — 99284 EMERGENCY DEPT VISIT MOD MDM: CPT | Mod: 25

## 2024-06-28 RX ORDER — HYDROCODONE BITARTRATE AND ACETAMINOPHEN 5; 325 MG/1; MG/1
1 TABLET ORAL EVERY 6 HOURS PRN
Qty: 14 TABLET | Refills: 0 | Status: SHIPPED | OUTPATIENT
Start: 2024-06-28

## 2024-06-28 RX ORDER — KETOROLAC TROMETHAMINE 30 MG/ML
30 INJECTION, SOLUTION INTRAMUSCULAR; INTRAVENOUS
Status: COMPLETED | OUTPATIENT
Start: 2024-06-28 | End: 2024-06-28

## 2024-06-28 RX ADMIN — KETOROLAC TROMETHAMINE 30 MG: 30 INJECTION, SOLUTION INTRAMUSCULAR at 03:06

## 2024-06-28 NOTE — ED PROVIDER NOTES
Encounter Date: 6/28/2024       History     Chief Complaint   Patient presents with    Wrist Injury     Pt c/o L wrist pain after moving a TV. Denies any objects with blunt force to wrist. Swelling noted, ROM decreased     Patient is a 79-year-old male presenting with complain of pain to the left wrist.  Patient states yesterday he was helping someone move a TV when they dropped their side of the TV and he thinks he strained his left wrist.  Patient states pain of the left wrist was worse when he woke up this morning.  There was no direct trauma to the wrist and there was no falls.      Review of patient's allergies indicates:  No Known Allergies  Past Medical History:   Diagnosis Date    Atherosclerotic heart disease of native coronary artery with other forms of angina pectoris 9/14/2023    Hypertension     Torn rotator cuff      Past Surgical History:   Procedure Laterality Date    NISSEN FUNDOPLICATION       Family History   Problem Relation Name Age of Onset    Heart disease Father       Social History     Tobacco Use    Smoking status: Former     Current packs/day: 1.00     Average packs/day: 1 pack/day for 20.0 years (20.0 ttl pk-yrs)     Types: Cigarettes   Substance Use Topics    Alcohol use: Not Currently    Drug use: Never     Review of Systems   Constitutional: Negative.    Respiratory: Negative.     Cardiovascular: Negative.    Gastrointestinal: Negative.    Musculoskeletal:  Positive for arthralgias and myalgias. Negative for back pain and neck pain.   Neurological: Negative.    Psychiatric/Behavioral: Negative.         Physical Exam     Initial Vitals [06/28/24 1505]   BP Pulse Resp Temp SpO2   (!) 178/84 68 18 97.5 °F (36.4 °C) 100 %      MAP       --         Physical Exam    Nursing note and vitals reviewed.  Constitutional: He appears well-developed and well-nourished.   Cardiovascular:  Normal rate, regular rhythm and normal heart sounds.           Pulmonary/Chest: Breath sounds normal. No  respiratory distress. He has no wheezes. He has no rhonchi.   Musculoskeletal:      Comments: There is no deformity of the left wrist.  There is no apparent swelling or redness to the left wrist.  Patient has point tenderness to palpation over the dorsum of the left wrist.  Patient does have decreased range of motion of the left wrist secondary to pain.     Neurological: He is alert and oriented to person, place, and time. He has normal strength.   Psychiatric: He has a normal mood and affect. Thought content normal.         ED Course   Procedures  Labs Reviewed - No data to display       Imaging Results              X-Ray Wrist Complete Right (Final result)  Result time 06/28/24 15:19:04      Final result by Adilene Bridges MD (06/28/24 15:19:04)                   Impression:      Degenerative changes of the wrist.      Electronically signed by: Adilene Bridges  Date:    06/28/2024  Time:    15:19               Narrative:    EXAMINATION:  XR WRIST COMPLETE 3 VIEWS RIGHT    CLINICAL HISTORY:  Pain, unspecified    COMPARISON:  None.    FINDINGS:  There is no acute fracture or malalignment.  There are degenerative changes of the wrist with joint space narrowing, subchondral cystic changes and osteophyte formation.  The soft tissues are unremarkable.                                       Medications   ketorolac injection 30 mg (30 mg Intramuscular Given 6/28/24 1517)     Medical Decision Making  Amount and/or Complexity of Data Reviewed  Radiology: ordered and independent interpretation performed.  Discussion of management or test interpretation with external provider(s): Patient was given a Toradol shot while in the ER.  X-ray show no acute changes.  Left cock-up wrist splint was applied.  Will discharge home with a prescription for Norco.    Risk  Prescription drug management.                                      Clinical Impression:  Final diagnoses:  [R52] Pain  [S63.502A] Sprain of left wrist, initial  encounter (Primary)          ED Disposition Condition    Discharge Stable          ED Prescriptions       Medication Sig Dispense Start Date End Date Auth. Provider    HYDROcodone-acetaminophen (NORCO) 5-325 mg per tablet Take 1 tablet by mouth every 6 (six) hours as needed for Pain. 14 tablet 6/28/2024 -- Crispin Joseph MD          Follow-up Information       Follow up With Specialties Details Why Contact Info    [facility] - Emergency Dept Emergency Medicine  As needed [address] [place] [id number] [phone]    Sulaiman Gutierrez MD Family Medicine In 1 week  [address] [facility] [address]  [phone]               Crispin Joseph MD  06/28/24 1530       Crispin Joseph MD  06/28/24 1532       Crispin Joseph MD  06/28/24 1539       Crispin Joseph MD  06/28/24 1538

## 2024-07-09 ENCOUNTER — OFFICE VISIT (OUTPATIENT)
Dept: FAMILY MEDICINE | Facility: CLINIC | Age: 79
End: 2024-07-09
Payer: MEDICARE

## 2024-07-09 VITALS
HEIGHT: 67 IN | OXYGEN SATURATION: 95 % | HEART RATE: 64 BPM | DIASTOLIC BLOOD PRESSURE: 72 MMHG | TEMPERATURE: 97 F | SYSTOLIC BLOOD PRESSURE: 136 MMHG | RESPIRATION RATE: 18 BRPM | BODY MASS INDEX: 24.8 KG/M2 | WEIGHT: 158 LBS

## 2024-07-09 DIAGNOSIS — M25.532 LEFT WRIST PAIN: Primary | ICD-10-CM

## 2024-07-09 DIAGNOSIS — R07.9 CHEST PAIN, UNSPECIFIED TYPE: ICD-10-CM

## 2024-07-09 DIAGNOSIS — I25.118 ATHEROSCLEROTIC HEART DISEASE OF NATIVE CORONARY ARTERY WITH OTHER FORMS OF ANGINA PECTORIS: ICD-10-CM

## 2024-07-09 DIAGNOSIS — G47.33 OSA (OBSTRUCTIVE SLEEP APNEA): ICD-10-CM

## 2024-07-09 PROCEDURE — 1160F RVW MEDS BY RX/DR IN RCRD: CPT | Mod: CPTII,,, | Performed by: FAMILY MEDICINE

## 2024-07-09 PROCEDURE — 3075F SYST BP GE 130 - 139MM HG: CPT | Mod: CPTII,,, | Performed by: FAMILY MEDICINE

## 2024-07-09 PROCEDURE — 3078F DIAST BP <80 MM HG: CPT | Mod: CPTII,,, | Performed by: FAMILY MEDICINE

## 2024-07-09 PROCEDURE — 1125F AMNT PAIN NOTED PAIN PRSNT: CPT | Mod: CPTII,,, | Performed by: FAMILY MEDICINE

## 2024-07-09 PROCEDURE — 99214 OFFICE O/P EST MOD 30 MIN: CPT | Mod: ,,, | Performed by: FAMILY MEDICINE

## 2024-07-09 PROCEDURE — 1159F MED LIST DOCD IN RCRD: CPT | Mod: CPTII,,, | Performed by: FAMILY MEDICINE

## 2024-07-09 RX ORDER — NITROGLYCERIN 0.4 MG/1
0.4 TABLET SUBLINGUAL EVERY 5 MIN PRN
Qty: 60 TABLET | Refills: 1 | Status: SHIPPED | OUTPATIENT
Start: 2024-07-09

## 2024-07-09 NOTE — PROGRESS NOTES
"left wrSubjective:      Patient ID: Ferny Archuleta is a 79 y.o. male.    Chief Complaint: 6 month f.u      Routine        Review of Systems   Constitutional: Negative.    Respiratory: Negative.          JACQUELYN: currently not using CPAP, unable to tolerate mask/settings   Cardiovascular:  Positive for chest pain (intermittent, needs rx for nitor).   Gastrointestinal: Negative.    Musculoskeletal:         Left wrist pain: reports pain past lifting television, seen in ER   Psychiatric/Behavioral: Negative.           Objective:     /72   Pulse 64   Temp 97.1 °F (36.2 °C) (Temporal)   Resp 18   Ht 5' 6.93" (1.7 m)   Wt 71.7 kg (158 lb)   SpO2 95%   BMI 24.80 kg/m²    Physical Exam  Constitutional:       Appearance: Normal appearance.   Cardiovascular:      Rate and Rhythm: Normal rate and regular rhythm.      Heart sounds: Normal heart sounds.   Pulmonary:      Effort: Pulmonary effort is normal.      Breath sounds: Normal breath sounds.   Musculoskeletal:      Comments: Left wrist: Full range of motion, no edema present   Neurological:      Mental Status: He is alert.   Psychiatric:         Mood and Affect: Mood normal.         Behavior: Behavior normal.         Thought Content: Thought content normal.         Judgment: Judgment normal.             Assessment:     Problem List Items Addressed This Visit          Cardiac/Vascular    Atherosclerotic heart disease of native coronary artery with other forms of angina pectoris    Current Assessment & Plan     Followed by Dr. Valdivia            Other    JAQCUELYN (obstructive sleep apnea)    Relevant Orders    Ambulatory referral/consult to Sleep Disorders     Other Visit Diagnoses       Left wrist pain    -  Primary    Chest pain, unspecified type        Relevant Medications    nitroGLYCERIN (NITROSTAT) 0.4 MG SL tablet             Plan:   1. Left wrist pain  Ace bandage p.r.n.   OTC NSAIDs   Monitor   Return to clinic with any concerns     2. Chest pain, unspecified " type  -     nitroGLYCERIN (NITROSTAT) 0.4 MG SL tablet; Place 1 tablet (0.4 mg total) under the tongue every 5 (five) minutes as needed for Chest pain.  Dispense: 60 tablet; Refill: 1  Nitro p.r.n.   ER precautions   Return to clinic with any concerns     3. Atherosclerotic heart disease of native coronary artery with other forms of angina pectoris  Assessment & Plan:  Followed by Dr. Valdivia    4. JACQUELYN (obstructive sleep apnea)  -     Ambulatory referral/consult to Sleep Disorders; Future; Expected date: 07/16/2024  Refer patient to Dr. Fam

## 2024-07-10 ENCOUNTER — TELEPHONE (OUTPATIENT)
Dept: NEUROLOGY | Facility: CLINIC | Age: 79
End: 2024-07-10
Payer: MEDICARE

## 2024-09-21 DIAGNOSIS — K21.9 GASTROESOPHAGEAL REFLUX DISEASE, UNSPECIFIED WHETHER ESOPHAGITIS PRESENT: ICD-10-CM

## 2024-09-23 DIAGNOSIS — I10 HYPERTENSION, UNSPECIFIED TYPE: ICD-10-CM

## 2024-09-23 RX ORDER — LISINOPRIL AND HYDROCHLOROTHIAZIDE 12.5; 2 MG/1; MG/1
1 TABLET ORAL DAILY
Qty: 90 TABLET | Refills: 1 | Status: SHIPPED | OUTPATIENT
Start: 2024-09-23 | End: 2025-03-22

## 2024-09-23 RX ORDER — PANTOPRAZOLE SODIUM 40 MG/1
40 TABLET, DELAYED RELEASE ORAL
Qty: 90 TABLET | Refills: 1 | Status: SHIPPED | OUTPATIENT
Start: 2024-09-23

## 2024-12-06 DIAGNOSIS — E78.5 HYPERLIPIDEMIA, UNSPECIFIED HYPERLIPIDEMIA TYPE: Primary | ICD-10-CM

## 2024-12-06 RX ORDER — SIMVASTATIN 10 MG/1
10 TABLET, FILM COATED ORAL NIGHTLY
Qty: 90 TABLET | Refills: 1 | Status: SHIPPED | OUTPATIENT
Start: 2024-12-06 | End: 2025-12-06

## 2024-12-12 DIAGNOSIS — Z11.4 ENCOUNTER FOR HIV (HUMAN IMMUNODEFICIENCY VIRUS) TEST: ICD-10-CM

## 2024-12-12 DIAGNOSIS — E78.5 HYPERLIPIDEMIA, UNSPECIFIED HYPERLIPIDEMIA TYPE: ICD-10-CM

## 2024-12-12 DIAGNOSIS — Z00.00 WELLNESS EXAMINATION: Primary | ICD-10-CM

## 2024-12-12 DIAGNOSIS — Z12.5 SCREENING FOR MALIGNANT NEOPLASM OF PROSTATE: ICD-10-CM

## 2024-12-12 DIAGNOSIS — I10 HYPERTENSION, UNSPECIFIED TYPE: ICD-10-CM

## 2024-12-12 DIAGNOSIS — Z11.59 NEED FOR HEPATITIS C SCREENING TEST: ICD-10-CM

## 2025-01-08 ENCOUNTER — LAB VISIT (OUTPATIENT)
Dept: LAB | Facility: HOSPITAL | Age: 80
End: 2025-01-08
Attending: FAMILY MEDICINE
Payer: MEDICARE

## 2025-01-08 DIAGNOSIS — Z11.4 ENCOUNTER FOR HIV (HUMAN IMMUNODEFICIENCY VIRUS) TEST: ICD-10-CM

## 2025-01-08 DIAGNOSIS — I10 HYPERTENSION, UNSPECIFIED TYPE: ICD-10-CM

## 2025-01-08 DIAGNOSIS — Z00.00 WELLNESS EXAMINATION: ICD-10-CM

## 2025-01-08 DIAGNOSIS — E78.5 HYPERLIPIDEMIA, UNSPECIFIED HYPERLIPIDEMIA TYPE: ICD-10-CM

## 2025-01-08 DIAGNOSIS — Z12.5 SCREENING FOR MALIGNANT NEOPLASM OF PROSTATE: ICD-10-CM

## 2025-01-08 DIAGNOSIS — Z11.59 NEED FOR HEPATITIS C SCREENING TEST: ICD-10-CM

## 2025-01-08 LAB
ALBUMIN SERPL-MCNC: 3.2 G/DL (ref 3.4–4.8)
ALBUMIN/GLOB SERPL: 0.8 RATIO (ref 1.1–2)
ALP SERPL-CCNC: 94 UNIT/L (ref 40–150)
ALT SERPL-CCNC: 10 UNIT/L (ref 0–55)
ANION GAP SERPL CALC-SCNC: 8 MEQ/L
AST SERPL-CCNC: 16 UNIT/L (ref 5–34)
BASOPHILS # BLD AUTO: 0.04 X10(3)/MCL
BASOPHILS NFR BLD AUTO: 0.5 %
BILIRUB SERPL-MCNC: 0.5 MG/DL
BUN SERPL-MCNC: 8 MG/DL (ref 8.4–25.7)
CALCIUM SERPL-MCNC: 9.3 MG/DL (ref 8.8–10)
CHLORIDE SERPL-SCNC: 101 MMOL/L (ref 98–107)
CHOLEST SERPL-MCNC: 122 MG/DL
CHOLEST/HDLC SERPL: 3 {RATIO} (ref 0–5)
CO2 SERPL-SCNC: 27 MMOL/L (ref 23–31)
CREAT SERPL-MCNC: 0.86 MG/DL (ref 0.72–1.25)
CREAT/UREA NIT SERPL: 9
EOSINOPHIL # BLD AUTO: 0.16 X10(3)/MCL (ref 0–0.9)
EOSINOPHIL NFR BLD AUTO: 2.1 %
ERYTHROCYTE [DISTWIDTH] IN BLOOD BY AUTOMATED COUNT: 13.2 % (ref 11.5–17)
GFR SERPLBLD CREATININE-BSD FMLA CKD-EPI: >60 ML/MIN/1.73/M2
GLOBULIN SER-MCNC: 3.8 GM/DL (ref 2.4–3.5)
GLUCOSE SERPL-MCNC: 98 MG/DL (ref 82–115)
HCT VFR BLD AUTO: 39.5 % (ref 42–52)
HCV AB SERPL QL IA: NONREACTIVE
HDLC SERPL-MCNC: 35 MG/DL (ref 35–60)
HGB BLD-MCNC: 13.1 G/DL (ref 14–18)
HIV 1+2 AB+HIV1 P24 AG SERPL QL IA: NONREACTIVE
IMM GRANULOCYTES # BLD AUTO: 0.06 X10(3)/MCL (ref 0–0.04)
IMM GRANULOCYTES NFR BLD AUTO: 0.8 %
LDLC SERPL CALC-MCNC: 65 MG/DL (ref 50–140)
LYMPHOCYTES # BLD AUTO: 1.1 X10(3)/MCL (ref 0.6–4.6)
LYMPHOCYTES NFR BLD AUTO: 14.3 %
MCH RBC QN AUTO: 28.7 PG (ref 27–31)
MCHC RBC AUTO-ENTMCNC: 33.2 G/DL (ref 33–36)
MCV RBC AUTO: 86.6 FL (ref 80–94)
MONOCYTES # BLD AUTO: 0.44 X10(3)/MCL (ref 0.1–1.3)
MONOCYTES NFR BLD AUTO: 5.7 %
NEUTROPHILS # BLD AUTO: 5.89 X10(3)/MCL (ref 2.1–9.2)
NEUTROPHILS NFR BLD AUTO: 76.6 %
NRBC BLD AUTO-RTO: 0 %
PLATELET # BLD AUTO: 242 X10(3)/MCL (ref 130–400)
PMV BLD AUTO: 9.3 FL (ref 7.4–10.4)
POTASSIUM SERPL-SCNC: 4.5 MMOL/L (ref 3.5–5.1)
PROT SERPL-MCNC: 7 GM/DL (ref 5.8–7.6)
PSA SERPL-MCNC: 0.65 NG/ML
RBC # BLD AUTO: 4.56 X10(6)/MCL (ref 4.7–6.1)
SODIUM SERPL-SCNC: 136 MMOL/L (ref 136–145)
TRIGL SERPL-MCNC: 109 MG/DL (ref 34–140)
VLDLC SERPL CALC-MCNC: 22 MG/DL
WBC # BLD AUTO: 7.69 X10(3)/MCL (ref 4.5–11.5)

## 2025-01-08 PROCEDURE — 36415 COLL VENOUS BLD VENIPUNCTURE: CPT

## 2025-01-08 PROCEDURE — 87389 HIV-1 AG W/HIV-1&-2 AB AG IA: CPT

## 2025-01-08 PROCEDURE — 80061 LIPID PANEL: CPT

## 2025-01-08 PROCEDURE — 84153 ASSAY OF PSA TOTAL: CPT

## 2025-01-08 PROCEDURE — 86803 HEPATITIS C AB TEST: CPT

## 2025-01-08 PROCEDURE — 85025 COMPLETE CBC W/AUTO DIFF WBC: CPT

## 2025-01-08 PROCEDURE — 80053 COMPREHEN METABOLIC PANEL: CPT

## 2025-01-13 ENCOUNTER — OFFICE VISIT (OUTPATIENT)
Dept: FAMILY MEDICINE | Facility: CLINIC | Age: 80
End: 2025-01-13
Payer: MEDICARE

## 2025-01-13 VITALS
SYSTOLIC BLOOD PRESSURE: 138 MMHG | TEMPERATURE: 97 F | OXYGEN SATURATION: 98 % | BODY MASS INDEX: 24.8 KG/M2 | HEIGHT: 67 IN | DIASTOLIC BLOOD PRESSURE: 70 MMHG | WEIGHT: 158 LBS | HEART RATE: 64 BPM | RESPIRATION RATE: 18 BRPM

## 2025-01-13 DIAGNOSIS — G62.9 NEUROPATHY: ICD-10-CM

## 2025-01-13 DIAGNOSIS — Z00.00 WELLNESS EXAMINATION: Primary | ICD-10-CM

## 2025-01-13 PROCEDURE — 3078F DIAST BP <80 MM HG: CPT | Mod: CPTII,,, | Performed by: FAMILY MEDICINE

## 2025-01-13 PROCEDURE — 1158F ADVNC CARE PLAN TLK DOCD: CPT | Mod: CPTII,,, | Performed by: FAMILY MEDICINE

## 2025-01-13 PROCEDURE — 3075F SYST BP GE 130 - 139MM HG: CPT | Mod: CPTII,,, | Performed by: FAMILY MEDICINE

## 2025-01-13 PROCEDURE — G0439 PPPS, SUBSEQ VISIT: HCPCS | Mod: ,,, | Performed by: FAMILY MEDICINE

## 2025-01-13 PROCEDURE — 1159F MED LIST DOCD IN RCRD: CPT | Mod: CPTII,,, | Performed by: FAMILY MEDICINE

## 2025-01-13 PROCEDURE — 1160F RVW MEDS BY RX/DR IN RCRD: CPT | Mod: CPTII,,, | Performed by: FAMILY MEDICINE

## 2025-01-13 PROCEDURE — 1126F AMNT PAIN NOTED NONE PRSNT: CPT | Mod: CPTII,,, | Performed by: FAMILY MEDICINE

## 2025-01-13 RX ORDER — GABAPENTIN 800 MG/1
800 TABLET ORAL 3 TIMES DAILY
Qty: 180 TABLET | Refills: 1 | Status: SHIPPED | OUTPATIENT
Start: 2025-01-13 | End: 2025-01-17 | Stop reason: SDUPTHER

## 2025-01-13 NOTE — PROGRESS NOTES
Patient ID: [id number]     Chief Complaint: Medicare AWV Follow Up      HPI:     Ferny Archuleta is a 79 y.o. male here today for a Medicare Wellness.       Opioid Screening: Patient medication list reviewed, patient is not taking prescription opioids. Patient is not using additional opioids than prescribed. Patient is not at low risk of substance abuse based on this opioid use history.       Subjective   The following components were reviewed and updated:  Medical history  Family History  Social history  Allergies  Current Medications  Immunizations  Health Maintenance  Patient Care Team        A comprehensive HEALTH RISK ASSESSMENT was completed today. Results are summarized below:                  The patient is NOT A TOBACCO USER.        All Questions regarding food, transportation or housing were not answered today.    I provided Ferny Archuleta with a 5-10 year written Screening Schedule per USPSTF age appropriate recommendations and a Personal Prevention Plan based on the results of today's Health Risk Assessment. Education, counseling, and referrals were provided as documented above and can be viewed in the After Visit Summary.      none  The patient was asked and declined the use of a free .    Advance Care Planning   Today we discussed advance care planning. He is interested in learning more about how to make Advance Directives. Information was provided and I offered to discuss more at his discretion.         -------------------------------------    Atherosclerotic heart disease of native coronary artery with other forms of angina pectoris    Hypertension    Torn rotator cuff        Past Surgical History:   Procedure Laterality Date    NISSEN FUNDOPLICATION         Review of patient's allergies indicates:  No Known Allergies    Outpatient Medications Marked as Taking for the 1/13/25 encounter (Office Visit) with Sulaiman Gutierrez MD   Medication Sig Dispense Refill    clopidogreL (PLAVIX) 75  mg tablet Take 75 mg by mouth once daily.      ibuprofen (ADVIL,MOTRIN) 800 MG tablet Take 800 mg by mouth 3 (three) times daily as needed for Pain.      lisinopriL-hydrochlorothiazide (PRINZIDE,ZESTORETIC) 20-12.5 mg per tablet Take 1 tablet by mouth once daily. 90 tablet 1    montelukast (SINGULAIR) 10 mg tablet TAKE 1 TABLET BY MOUTH EVERY EVENING 30 tablet 3    nitroGLYCERIN (NITROSTAT) 0.4 MG SL tablet Place 1 tablet (0.4 mg total) under the tongue every 5 (five) minutes as needed for Chest pain. 60 tablet 1    pantoprazole (PROTONIX) 40 MG tablet TAKE 1 TABLET(40 MG) BY MOUTH DAILY 90 tablet 1    simvastatin (ZOCOR) 10 MG tablet Take 1 tablet (10 mg total) by mouth every evening. 90 tablet 1    [DISCONTINUED] gabapentin (NEURONTIN) 800 MG tablet Take 1 tablet (800 mg total) by mouth 3 (three) times daily. 180 tablet 1       Social History     Socioeconomic History    Marital status:    Tobacco Use    Smoking status: Former     Current packs/day: 1.00     Average packs/day: 1 pack/day for 20.0 years (20.0 ttl pk-yrs)     Types: Cigarettes   Substance and Sexual Activity    Alcohol use: Not Currently    Drug use: Never     Social Drivers of Health     Financial Resource Strain: Low Risk  (9/14/2023)    Overall Financial Resource Strain (CARDIA)     Difficulty of Paying Living Expenses: Not hard at all   Food Insecurity: No Food Insecurity (9/14/2023)    Hunger Vital Sign     Worried About Running Out of Food in the Last Year: Never true     Ran Out of Food in the Last Year: Never true   Transportation Needs: No Transportation Needs (9/14/2023)    PRAPARE - Transportation     Lack of Transportation (Medical): No     Lack of Transportation (Non-Medical): No   Physical Activity: Insufficiently Active (9/14/2023)    Exercise Vital Sign     Days of Exercise per Week: 7 days     Minutes of Exercise per Session: 20 min   Stress: No Stress Concern Present (9/14/2023)    [employer] [place] [employer] -  Occupational Stress Questionnaire     Feeling of Stress : Not at all   Housing Stability: Unknown (9/14/2023)    Housing Stability Vital Sign     Unable to Pay for Housing in the Last Year: No     Unstable Housing in the Last Year: No        Family History   Problem Relation Name Age of Onset    Heart disease Father          Patient Care Team:  Sulaiman Gutierrez MD as PCP - General (Family Medicine)  William Valdivia MD as Consulting Physician (Cardiology)       Subjective:     Review of Systems   Constitutional: Negative.    Respiratory: Negative.     Cardiovascular: Negative.    Gastrointestinal: Negative.    Neurological:  Positive for tingling (needs Rx).   Psychiatric/Behavioral: Negative.           Patient Reported Health Risk Assessment  What [age] 70-79  Are you male or female?: Male  During the past four weeks, how much have you been bothered by emotional problems such as feeling anxious, depressed, irritable, sad, or downhearted and blue?: Not at all  During the past five weeks, has your physical and/or emotional health limited your social activities with family, friends, neighbors, or groups?: Not at all  During the past four weeks, how much bodily pain have you generally had?: Moderate pain  During the past four weeks, was someone available to help if you needed and wanted help?: Yes, as much as I wanted  During the past four weeks, what was the hardest physical activity you could do for at least two minutes?: Light  Can you get to places out of walking distance without help?  (For example, can you travel alone on buses or taxis, or drive your own car?): Yes  Can you go shopping for groceries or clothes without someone's help?: Yes  Can you prepare your own meals?: Yes  Can you do your own housework without help?: Yes  Because of any health problems, do you need the help of another person with your personal care needs such as eating, bathing, dressing, or getting around the house?: No  Can you  "handle your own money without help?: Yes  During the past four weeks, how would you rate your health in general?: Good  How have things been going for you during the past four weeks?: Pretty well  Are you having difficulties driving your car?: No  Do you always fasten your seat belt when you are in a car?: Yes, usually  How often in the past four weeks have you been bothered by falling or dizzy when standing up?: Never  How often in the past four weeks have you been bothered by sexual problems?: Never  How often in the past four weeks have you been bothered by trouble eating well?: Never  How often in the past four weeks have you been bothered by teeth or denture problems?: Never  How often in the past four weeks have you been bothered with problems using the telephone?: Never  How often in the past four weeks have you been bothered by tiredness or fatigue?: Sometimes  Have you fallen two or more times in the past year?: No  Are you afraid of falling?: Yes  Are you a smoker?: No  During the past four weeks, how many drinks of wine, beer, or other alcoholic beverages did you have?: No alcohol at all  Do you exercise for about 20 minutes three or more days a week?: Yes, some of the time  Have you been given any information to help you with hazards in your house that might hurt you?: Yes  Have you been given any information to help you with keeping track of your medications?: Yes  How often do you have trouble taking medicines the way you've been told to take them?: I always take them as prescribed  How confident are you that you can control and manage most of your health problems?: Very confident  What is your race? (Check all that apply.):     Objective:     /70   Pulse 64   Temp 97 °F (36.1 °C) (Temporal)   Resp 18   Ht 5' 6.93" (1.7 m)   Wt 71.7 kg (158 lb)   SpO2 98%   BMI 24.80 kg/m²     Physical Exam  Constitutional:       Appearance: Normal appearance.   Cardiovascular:      Rate and " Rhythm: Normal rate and regular rhythm.   Pulmonary:      Effort: Pulmonary effort is normal.      Breath sounds: Normal breath sounds.   Abdominal:      General: Abdomen is flat. Bowel sounds are normal.      Palpations: Abdomen is soft.   Neurological:      Mental Status: He is alert.   Psychiatric:         Mood and Affect: Mood normal.         Behavior: Behavior normal.         Thought Content: Thought content normal.         Judgment: Judgment normal.       Recent Results (from the past 3 weeks)   Comprehensive Metabolic Panel    Collection Time: 01/08/25  7:08 AM   Result Value Ref Range    Sodium 136 136 - 145 mmol/L    Potassium 4.5 3.5 - 5.1 mmol/L    Chloride 101 98 - 107 mmol/L    CO2 27 23 - 31 mmol/L    Glucose 98 82 - 115 mg/dL    Blood Urea Nitrogen 8.0 (L) 8.4 - 25.7 mg/dL    Creatinine 0.86 0.72 - 1.25 mg/dL    Calcium 9.3 8.8 - 10.0 mg/dL    Protein Total 7.0 5.8 - 7.6 gm/dL    Albumin 3.2 (L) 3.4 - 4.8 g/dL    Globulin 3.8 (H) 2.4 - 3.5 gm/dL    Albumin/Globulin Ratio 0.8 (L) 1.1 - 2.0 ratio    Bilirubin Total 0.5 <=1.5 mg/dL    ALP 94 40 - 150 unit/L    ALT 10 0 - 55 unit/L    AST 16 5 - 34 unit/L    eGFR >60 mL/min/1.73/m2    Anion Gap 8.0 mEq/L    BUN/Creatinine Ratio 9    Hepatitis C Antibody    Collection Time: 01/08/25  7:08 AM   Result Value Ref Range    Hep C Ab Interp Nonreactive Nonreactive   HIV 1/2 Ag/Ab (4th Gen)    Collection Time: 01/08/25  7:08 AM   Result Value Ref Range    HIV Nonreactive Nonreactive   PSA, Screening    Collection Time: 01/08/25  7:08 AM   Result Value Ref Range    Prostate Specific Antigen 0.65 <=4.00 ng/mL   Lipid Panel    Collection Time: 01/08/25  7:08 AM   Result Value Ref Range    Cholesterol Total 122 <=200 mg/dL    HDL Cholesterol 35 35 - 60 mg/dL    Triglyceride 109 34 - 140 mg/dL    Cholesterol/HDL Ratio 3 0 - 5    Very Low Density Lipoprotein 22     LDL Cholesterol 65.00 50.00 - 140.00 mg/dL   CBC with Differential    Collection Time: 01/08/25  7:08 AM    Result Value Ref Range    WBC 7.69 4.50 - 11.50 x10(3)/mcL    RBC 4.56 (L) 4.70 - 6.10 x10(6)/mcL    Hgb 13.1 (L) 14.0 - 18.0 g/dL    Hct 39.5 (L) 42.0 - 52.0 %    MCV 86.6 80.0 - 94.0 fL    MCH 28.7 27.0 - 31.0 pg    MCHC 33.2 33.0 - 36.0 g/dL    RDW 13.2 11.5 - 17.0 %    Platelet 242 130 - 400 x10(3)/mcL    MPV 9.3 7.4 - 10.4 fL    Neut % 76.6 %    Lymph % 14.3 %    Mono % 5.7 %    Eos % 2.1 %    Basophil % 0.5 %    Imm Grans % 0.8 %    Neut # 5.89 2.1 - 9.2 x10(3)/mcL    Lymph # 1.10 0.6 - 4.6 x10(3)/mcL    Mono # 0.44 0.1 - 1.3 x10(3)/mcL    Eos # 0.16 0 - 0.9 x10(3)/mcL    Baso # 0.04 <=0.2 x10(3)/mcL    Imm Gran # 0.06 (H) 0.00 - 0.04 x10(3)/mcL    NRBC% 0.0 %               No data to display                  1/5/2024     8:30 AM 9/22/2023     8:30 AM 9/14/2023    10:45 AM 12/19/2022     8:45 AM   Fall Risk Assessment - Outpatient   Mobility Status Ambulatory Ambulatory Ambulatory Ambulatory   Number of falls 0  1 0   Identified as fall risk False  False False           Depression Screening  Over the past two weeks, has the patient felt down, depressed, or hopeless?: No  Over the past two weeks, has the patient felt little interest or pleasure in doing things?: No  Functional Ability/Safety Screening  Was the patient's timed Up & Go test unsteady or longer than 30 seconds?: No  Does the patient need help with phone, transportation, shopping, preparing meals, housework, laundry, meds, or managing money?: No  Does the patient's home have rugs in the hallway, lack grab bars in the bathroom, lack handrails on the stairs or have poor lighting?: No  Have you noticed any hearing difficulties?: No  Cognitive Function (Assessed through direct observation with due consideration of information obtained by way of patient reports and/or concerns raised by family, friends, caretakers, or others)    Does the patient repeat questions/statements in the same day?: No  Does the patient have trouble remembering the date, year,  and time?: No  Does the patient have difficulty managing finances?: No  Does the patient have a decreased sense of direction?: No  Assessment/Plan:     1. Wellness examination  Assessment & Plan:  Lab work reviewed with patient  Continue current medication  Continue diet/exercise  Advanced directive discussed with patient  Return to clinic with any concerns    Advance Care Planning    Date: 01/13/2025    Living Will  During this visit, I engaged the patient  in the voluntary advance care planning process.  The patient and I reviewed the role for advance directives and their purpose in directing future healthcare if the patient's unable to speak for him/herself.  At this point in time, the patient does have full decision-making capacity.  We discussed different extreme health states that he could experience, and reviewed what kind of medical care he would want in those situations.  The patient communicated that if he were comatose and had little chance of a meaningful recovery, he would not want machines/life-sustaining treatments used.  I spent a total of 5 minutes engaging the patient in this advance care planning discussion.     2. Neuropathy  -     gabapentin (NEURONTIN) 800 MG tablet; Take 1 tablet (800 mg total) by mouth 3 (three) times daily.  Dispense: 180 tablet; Refill: 1  Controlled  Continue current Rx medication  Return to clinic with any concerns      Medicare Annual Wellness and Personalized Prevention Plan:   Fall Risk + Home Safety + Hearing Impairment + Depression Screen + Opioid and Substance Abuse Screening + Cognitive Impairment Screen + Health Risk Assessment all reviewed.     Health Maintenance Topics with due status: Not Due       Topic Last Completion Date    Aspirin/Antiplatelet Therapy 07/09/2024    Lipid Panel 01/08/2025      The patient's Health Maintenance was reviewed and the following appears to be due at this time:   Health Maintenance Due   Topic Date Due    TETANUS VACCINE  Never  done    Shingles Vaccine (1 of 2) Never done    Pneumococcal Vaccines (Age 50+) (1 of 1 - PCV) Never done    RSV Vaccine (Age 60+ and Pregnant patients) (1 - 1-dose 75+ series) Never done    Influenza Vaccine (1) 09/01/2024    COVID-19 Vaccine (1 - 2024-25 season) Never done       Advance Care Planning   I attest to discussing Advance Care Planning with patient and/or family member.  Education was provided including the importance of the Health Care Power of , Advance Directives, and/or [place] documentation.  The patient expressed understanding to the importance of this information and discussion.         Follow up in about 6 months (around 7/13/2025). In addition to their scheduled follow up, the patient has also been instructed to follow up on as needed basis.

## 2025-01-13 NOTE — ASSESSMENT & PLAN NOTE
Lab work reviewed with patient  Continue current medication  Continue diet/exercise  Advanced directive discussed with patient  Return to clinic with any concerns    Advance Care Planning     Date: 01/13/2025    Living Will  During this visit, I engaged the patient  in the voluntary advance care planning process.  The patient and I reviewed the role for advance directives and their purpose in directing future healthcare if the patient's unable to speak for him/herself.  At this point in time, the patient does have full decision-making capacity.  We discussed different extreme health states that he could experience, and reviewed what kind of medical care he would want in those situations.  The patient communicated that if he were comatose and had little chance of a meaningful recovery, he would not want machines/life-sustaining treatments used.  I spent a total of 5 minutes engaging the patient in this advance care planning discussion.

## 2025-01-17 DIAGNOSIS — G62.9 NEUROPATHY: ICD-10-CM

## 2025-01-17 RX ORDER — GABAPENTIN 800 MG/1
800 TABLET ORAL 3 TIMES DAILY
Qty: 180 TABLET | Refills: 1 | Status: SHIPPED | OUTPATIENT
Start: 2025-01-17

## 2025-01-17 RX ORDER — GABAPENTIN 800 MG/1
800 TABLET ORAL 3 TIMES DAILY
Qty: 180 TABLET | Refills: 1 | Status: SHIPPED | OUTPATIENT
Start: 2025-01-17 | End: 2025-01-17

## 2025-02-15 ENCOUNTER — HOSPITAL ENCOUNTER (EMERGENCY)
Facility: HOSPITAL | Age: 80
Discharge: HOME | End: 2025-02-15
Attending: GENERAL PRACTICE
Payer: MEDICARE

## 2025-02-15 VITALS
TEMPERATURE: 98 F | OXYGEN SATURATION: 99 % | DIASTOLIC BLOOD PRESSURE: 79 MMHG | WEIGHT: 157.88 LBS | BODY MASS INDEX: 24.78 KG/M2 | HEIGHT: 67 IN | SYSTOLIC BLOOD PRESSURE: 124 MMHG | HEART RATE: 66 BPM | RESPIRATION RATE: 22 BRPM

## 2025-02-15 DIAGNOSIS — R07.81 RIB PAIN ON RIGHT SIDE: ICD-10-CM

## 2025-02-15 DIAGNOSIS — S22.31XA CLOSED FRACTURE OF ONE RIB OF RIGHT SIDE, INITIAL ENCOUNTER: Primary | ICD-10-CM

## 2025-02-15 PROCEDURE — 99283 EMERGENCY DEPT VISIT LOW MDM: CPT | Mod: 25

## 2025-02-15 PROCEDURE — 25000003 PHARM REV CODE 250: Performed by: GENERAL PRACTICE

## 2025-02-15 RX ORDER — NABUMETONE 500 MG/1
500 TABLET, FILM COATED ORAL 2 TIMES DAILY PRN
Qty: 20 TABLET | Refills: 0 | Status: SHIPPED | OUTPATIENT
Start: 2025-02-15

## 2025-02-15 RX ORDER — KETOROLAC TROMETHAMINE 10 MG/1
10 TABLET, FILM COATED ORAL
Status: DISCONTINUED | OUTPATIENT
Start: 2025-02-15 | End: 2025-02-15

## 2025-02-15 RX ORDER — KETOROLAC TROMETHAMINE 10 MG/1
10 TABLET, FILM COATED ORAL
Status: COMPLETED | OUTPATIENT
Start: 2025-02-15 | End: 2025-02-15

## 2025-02-15 RX ADMIN — KETOROLAC TROMETHAMINE 10 MG: 10 TABLET, FILM COATED ORAL at 06:02

## 2025-02-16 ENCOUNTER — RESULTS FOLLOW-UP (OUTPATIENT)
Dept: EMERGENCY MEDICINE | Facility: HOSPITAL | Age: 80
End: 2025-02-16

## 2025-02-16 NOTE — ED PROVIDER NOTES
Encounter Date: 2/15/2025       History     Chief Complaint   Patient presents with    Rib Injury     Right rib pain after leaning on side of recliner      Patient with right rib pain after bending over and feeling a pop.  He reports that the pain is now constant.  Denies shortness of breath.  He is able to ambulate normally.    The history is provided by the patient.   Chest Pain  The current episode started 1 to 2 hours ago. Chest pain occurs constantly. The pain is associated with breathing, coughing and lifting. At its most intense, the chest pain is at 5/10. The chest pain is currently at 5/10. The quality of the pain is described as aching and burning. The pain does not radiate. Chest pain is worsened by certain positions and deep breathing. He tried nothing for the symptoms.     Review of patient's allergies indicates:  No Known Allergies  Past Medical History:   Diagnosis Date    Atherosclerotic heart disease of native coronary artery with other forms of angina pectoris 9/14/2023    Hypertension     Torn rotator cuff      Past Surgical History:   Procedure Laterality Date    NISSEN FUNDOPLICATION       Family History   Problem Relation Name Age of Onset    Heart disease Father       Social History[1]  Review of Systems   Constitutional: Negative.    HENT: Negative.     Eyes: Negative.    Respiratory: Negative.     Cardiovascular:  Positive for chest pain.   Gastrointestinal: Negative.    Endocrine: Negative.    Genitourinary: Negative.    Musculoskeletal:  Positive for back pain.   Allergic/Immunologic: Negative.    Neurological: Negative.    Hematological: Negative.    Psychiatric/Behavioral: Negative.     All other systems reviewed and are negative.      Physical Exam     Initial Vitals [02/15/25 1851]   BP Pulse Resp Temp SpO2   124/79 66 (!) 22 98 °F (36.7 °C) 99 %      MAP       --         Physical Exam    Nursing note and vitals reviewed.  Constitutional: He appears well-developed and well-nourished.    HENT:   Head: Normocephalic and atraumatic.   Eyes: EOM are normal. Pupils are equal, round, and reactive to light.   Neck: Neck supple.   Normal range of motion.  Cardiovascular:  Normal rate, regular rhythm, normal heart sounds and intact distal pulses.           Pulmonary/Chest: Breath sounds normal. He exhibits tenderness.     Abdominal: Abdomen is soft. Bowel sounds are normal.   Musculoskeletal:         General: Normal range of motion.      Cervical back: Normal range of motion and neck supple.     Neurological: He is alert and oriented to person, place, and time. He has normal strength. GCS score is 15. GCS eye subscore is 4. GCS verbal subscore is 5. GCS motor subscore is 6.   Skin: Skin is warm and dry.   Psychiatric: He has a normal mood and affect. His behavior is normal. Judgment and thought content normal.         ED Course   Procedures  Labs Reviewed - No data to display       Imaging Results              X-Ray Chest PA And Lateral (In process)                      X-Ray Ribs 2 View Right (In process)                      Medications   ketorolac tablet 10 mg (10 mg Oral Given 2/15/25 1854)     Medical Decision Making  Exquisite tenderness of the chest wall however there is no crepitance noted and no discrete fracture palpated.  There is questionable fracture of the possible 9th or 8th rib however use of the PACS view were has made evaluation difficult.  We will try this is rib fracture and give the patient a prescription.    Amount and/or Complexity of Data Reviewed  Radiology: ordered.    Risk  Prescription drug management.                                      Clinical Impression:  Final diagnoses:  [R07.81] Rib pain on right side  [S22.31XA] Closed fracture of one rib of right side, initial encounter (Primary)          ED Disposition Condition    Discharge Stable          ED Prescriptions       Medication Sig Dispense Start Date End Date Auth. Provider    nabumetone (RELAFEN) 500 MG tablet Take 1  tablet (500 mg total) by mouth 2 (two) times daily as needed for Pain. Take with food 20 tablet 2/15/2025 -- Troy Helm MD          Follow-up Information       Follow up With Specialties Details Why Contact Info    Sulaiman Gutierrez MD Family Medicine Call in 2 days As needed [address] [place] [address]  [phone]                 [1]   Social History  Tobacco Use    Smoking status: Former     Current packs/day: 1.00     Average packs/day: 1 pack/day for 20.0 years (20.0 ttl pk-yrs)     Types: Cigarettes   Substance Use Topics    Alcohol use: Not Currently    Drug use: Never        Troy eHlm MD  02/15/25 8205

## 2025-02-16 NOTE — ED NOTES
Patient ambulated to ER room 3 with steady gait.  Stated that he was at home and bent over supporting him weight on his right torso on his recliner when he heard a popping sound.  Pain now a 9/10 to right sided torso.

## 2025-03-07 DIAGNOSIS — I10 HYPERTENSION, UNSPECIFIED TYPE: ICD-10-CM

## 2025-03-07 RX ORDER — LISINOPRIL AND HYDROCHLOROTHIAZIDE 12.5; 2 MG/1; MG/1
1 TABLET ORAL
Qty: 90 TABLET | Refills: 1 | Status: SHIPPED | OUTPATIENT
Start: 2025-03-07

## 2025-04-21 DIAGNOSIS — J45.909 ASTHMA DUE TO SEASONAL ALLERGIES: ICD-10-CM

## 2025-04-21 RX ORDER — MONTELUKAST SODIUM 10 MG/1
10 TABLET ORAL NIGHTLY
Qty: 30 TABLET | Refills: 3 | Status: SHIPPED | OUTPATIENT
Start: 2025-04-21

## 2025-04-21 RX ORDER — MONTELUKAST SODIUM 10 MG/1
10 TABLET ORAL NIGHTLY
Qty: 30 TABLET | Refills: 11 | Status: SHIPPED | OUTPATIENT
Start: 2025-04-21 | End: 2025-04-21 | Stop reason: SDUPTHER

## 2025-07-11 DIAGNOSIS — G62.9 NEUROPATHY: ICD-10-CM

## 2025-07-11 DIAGNOSIS — K21.9 GASTROESOPHAGEAL REFLUX DISEASE, UNSPECIFIED WHETHER ESOPHAGITIS PRESENT: ICD-10-CM

## 2025-07-11 DIAGNOSIS — R07.9 CHEST PAIN, UNSPECIFIED TYPE: ICD-10-CM

## 2025-07-11 RX ORDER — GABAPENTIN 800 MG/1
800 TABLET ORAL 3 TIMES DAILY
Qty: 180 TABLET | Refills: 1 | Status: SHIPPED | OUTPATIENT
Start: 2025-07-11

## 2025-07-11 RX ORDER — PANTOPRAZOLE SODIUM 40 MG/1
40 TABLET, DELAYED RELEASE ORAL
Qty: 90 TABLET | Refills: 1 | Status: SHIPPED | OUTPATIENT
Start: 2025-07-11

## 2025-07-11 RX ORDER — NITROGLYCERIN 0.4 MG/1
TABLET SUBLINGUAL
Qty: 50 TABLET | Refills: 0 | Status: SHIPPED | OUTPATIENT
Start: 2025-07-11

## 2025-07-17 ENCOUNTER — OFFICE VISIT (OUTPATIENT)
Dept: FAMILY MEDICINE | Facility: CLINIC | Age: 80
End: 2025-07-17
Payer: MEDICARE

## 2025-07-17 VITALS
HEART RATE: 68 BPM | HEIGHT: 67 IN | DIASTOLIC BLOOD PRESSURE: 58 MMHG | SYSTOLIC BLOOD PRESSURE: 130 MMHG | OXYGEN SATURATION: 92 % | TEMPERATURE: 97 F | RESPIRATION RATE: 16 BRPM | BODY MASS INDEX: 22.76 KG/M2 | WEIGHT: 145 LBS

## 2025-07-17 DIAGNOSIS — E78.49 OTHER HYPERLIPIDEMIA: ICD-10-CM

## 2025-07-17 DIAGNOSIS — R13.10 DYSPHAGIA, UNSPECIFIED TYPE: ICD-10-CM

## 2025-07-17 DIAGNOSIS — I25.118 ATHEROSCLEROTIC HEART DISEASE OF NATIVE CORONARY ARTERY WITH OTHER FORMS OF ANGINA PECTORIS: Primary | ICD-10-CM

## 2025-07-17 DIAGNOSIS — I10 HYPERTENSION, UNSPECIFIED TYPE: ICD-10-CM

## 2025-07-17 PROBLEM — E78.5 HYPERLIPIDEMIA: Status: ACTIVE | Noted: 2025-07-17

## 2025-07-17 PROBLEM — E78.5 HYPERLIPIDEMIA: Status: RESOLVED | Noted: 2025-07-17 | Resolved: 2025-07-17

## 2025-07-17 RX ORDER — SIMVASTATIN 10 MG/1
10 TABLET, FILM COATED ORAL NIGHTLY
Qty: 90 TABLET | Refills: 3 | Status: SHIPPED | OUTPATIENT
Start: 2025-07-17 | End: 2026-07-17

## 2025-07-17 NOTE — ASSESSMENT & PLAN NOTE
Continue Plavix 75 mg daily  Continue Zocor 10 mg q.h.s.   Keep scheduled follow up with Cardiology   [FreeTextEntry2] : see HPI

## 2025-07-17 NOTE — ASSESSMENT & PLAN NOTE
Controlled  Continue lisinopril HCTZ 20/12.5 mg daily   Monitor BP  Return to clinic with any concerns

## 2025-07-17 NOTE — PROGRESS NOTES
Subjective:     Patient ID: Ferny Archuleta is a 80 y.o. male.    Chief Complaint: 6 month f/u        History of Present Illness    CHIEF COMPLAINT:  Patient presents today for routine six-month follow up.    CARDIOVASCULAR:  He has a history of two cardiac stents. He was previously discontinued from Plavix by his prior cardiologist, which resulted in significant symptoms including chest pain, shortness of breath, and balance instability causing frequent falls. He was unable to walk in the yard due to symptoms. He is currently seeing new cardiologist, Dr. Emanuel Britton, who restarted Plavix and immediately referred him to the hospital. He reports feeling much better since resuming medication and now experiences improved mobility and reduced symptomatic burden. He notes generalized bruising, which he believes is related to recent medical changes.    DYSPHAGIA:  He reports significant swallowing difficulties affecting both solids and liquids, including trouble swallowing water. His diet is currently limited to soft foods such as microwave potatoes with butter and grits. He experiences food blocking and must bite through when eating. He plans to follow up with Dr. Pete for potential esophageal dilation procedure.    WEIGHT LOSS:  He reports unintentional weight loss of 13 lbs, with current weight at 145 lbs, down from previous weight of 158 lbs.    MEDICATIONS:  He is currently taking statin as prescribed, lisinopril for blood pressure management which appears well-controlled, naproxen, and nitroglycerin. He reports cholesterol is currently at goal.    LABS / TEST RESULTS:  Recent labs at [facility] was within normal limits, with cholesterol levels noted as satisfactory.      ROS:  General: +weight loss  Cardiovascular: +chest pain  Respiratory: +shortness of breath  Gastrointestinal: +difficulty swallowing  Musculoskeletal: +falling, +difficulty walking  Skin: +easy bruising            Review of  "Systems    Objective:     BP (!) 130/58   Pulse 68   Temp 97 °F (36.1 °C) (Temporal)   Resp 16   Ht 5' 6.93" (1.7 m)   Wt 65.8 kg (145 lb)   SpO2 (!) 92%   BMI 22.76 kg/m²    Physical Exam  Constitutional:       Appearance: Normal appearance.   Cardiovascular:      Rate and Rhythm: Normal rate and regular rhythm.      Heart sounds: Normal heart sounds.   Pulmonary:      Effort: Pulmonary effort is normal.      Breath sounds: Normal breath sounds.   Neurological:      Mental Status: He is alert.   Psychiatric:         Mood and Affect: Mood normal.         Behavior: Behavior normal.         Thought Content: Thought content normal.         Judgment: Judgment normal.             Assessment:     Problem List Items Addressed This Visit          Cardiac/Vascular    Atherosclerotic heart disease of native coronary artery with other forms of angina pectoris - Primary    Current Assessment & Plan   Continue Plavix 75 mg daily  Continue Zocor 10 mg q.h.s.   Keep scheduled follow up with Cardiology         Hypertension    Current Assessment & Plan   Controlled  Continue lisinopril HCTZ 20/12.5 mg daily   Monitor BP  Return to clinic with any concerns         RESOLVED: Hyperlipidemia    Relevant Medications    simvastatin (ZOCOR) 10 MG tablet     Other Visit Diagnoses         Dysphagia, unspecified type        Relevant Orders    Ambulatory referral/consult to Gastroenterology             Plan:   1. Atherosclerotic heart disease of native coronary artery with other forms of angina pectoris  Assessment & Plan:  Continue Plavix 75 mg daily  Continue Zocor 10 mg q.h.s.   Keep scheduled follow up with Cardiology      2. Hypertension, unspecified type  Assessment & Plan:  Controlled  Continue lisinopril HCTZ 20/12.5 mg daily   Monitor BP  Return to clinic with any concerns      3. Other hyperlipidemia  -     simvastatin (ZOCOR) 10 MG tablet; Take 1 tablet (10 mg total) by mouth every evening.  Dispense: 90 tablet; Refill: " 3  Continue Zocor 10 mg q.h.s.   Continue diet modification   FLP with next lab work     4. Dysphagia, unspecified type  -     Ambulatory referral/consult to Gastroenterology; Future; Expected date: 07/24/2025  Refer patient to Dr. Pete for probable esophageal dilatation                 This note was generated with the assistance of ambient listening technology. Verbal consent was obtained by the patient and accompanying visitor(s) for the recording of patient appointment to facilitate this note. I attest to having reviewed and edited the generated note for accuracy, though some syntax or spelling errors may persist. Please contact the author of this note for any clarification.

## 2025-08-28 ENCOUNTER — DOCUMENTATION ONLY (OUTPATIENT)
Dept: FAMILY MEDICINE | Facility: CLINIC | Age: 80
End: 2025-08-28
Payer: MEDICARE